# Patient Record
Sex: MALE | Race: WHITE | NOT HISPANIC OR LATINO | URBAN - METROPOLITAN AREA
[De-identification: names, ages, dates, MRNs, and addresses within clinical notes are randomized per-mention and may not be internally consistent; named-entity substitution may affect disease eponyms.]

---

## 2018-07-16 ENCOUNTER — INPATIENT (INPATIENT)
Facility: HOSPITAL | Age: 58
LOS: 0 days | Discharge: LEFT BEFORE DISCHARGE | DRG: 310 | End: 2018-07-16
Attending: INTERNAL MEDICINE | Admitting: INTERNAL MEDICINE
Payer: COMMERCIAL

## 2018-07-16 VITALS
OXYGEN SATURATION: 97 % | HEART RATE: 168 BPM | DIASTOLIC BLOOD PRESSURE: 98 MMHG | RESPIRATION RATE: 18 BRPM | SYSTOLIC BLOOD PRESSURE: 116 MMHG

## 2018-07-16 VITALS — SYSTOLIC BLOOD PRESSURE: 110 MMHG | DIASTOLIC BLOOD PRESSURE: 68 MMHG | HEART RATE: 109 BPM

## 2018-07-16 DIAGNOSIS — R73.9 HYPERGLYCEMIA, UNSPECIFIED: ICD-10-CM

## 2018-07-16 DIAGNOSIS — I48.91 UNSPECIFIED ATRIAL FIBRILLATION: ICD-10-CM

## 2018-07-16 DIAGNOSIS — K21.9 GASTRO-ESOPHAGEAL REFLUX DISEASE WITHOUT ESOPHAGITIS: ICD-10-CM

## 2018-07-16 DIAGNOSIS — Z29.9 ENCOUNTER FOR PROPHYLACTIC MEASURES, UNSPECIFIED: ICD-10-CM

## 2018-07-16 DIAGNOSIS — J44.9 CHRONIC OBSTRUCTIVE PULMONARY DISEASE, UNSPECIFIED: ICD-10-CM

## 2018-07-16 DIAGNOSIS — E03.9 HYPOTHYROIDISM, UNSPECIFIED: ICD-10-CM

## 2018-07-16 DIAGNOSIS — R07.9 CHEST PAIN, UNSPECIFIED: ICD-10-CM

## 2018-07-16 LAB
ALBUMIN SERPL ELPH-MCNC: 3.5 G/DL — SIGNIFICANT CHANGE UP (ref 3.5–5)
ALP SERPL-CCNC: 75 U/L — SIGNIFICANT CHANGE UP (ref 40–120)
ALT FLD-CCNC: 47 U/L DA — SIGNIFICANT CHANGE UP (ref 10–60)
ANION GAP SERPL CALC-SCNC: 7 MMOL/L — SIGNIFICANT CHANGE UP (ref 5–17)
APPEARANCE UR: CLEAR — SIGNIFICANT CHANGE UP
APTT BLD: 28.3 SEC — SIGNIFICANT CHANGE UP (ref 27.5–37.4)
AST SERPL-CCNC: 33 U/L — SIGNIFICANT CHANGE UP (ref 10–40)
BASOPHILS # BLD AUTO: 0.2 K/UL — SIGNIFICANT CHANGE UP (ref 0–0.2)
BASOPHILS # BLD AUTO: 0.2 K/UL — SIGNIFICANT CHANGE UP (ref 0–0.2)
BASOPHILS NFR BLD AUTO: 1.2 % — SIGNIFICANT CHANGE UP (ref 0–2)
BASOPHILS NFR BLD AUTO: 1.4 % — SIGNIFICANT CHANGE UP (ref 0–2)
BILIRUB SERPL-MCNC: 1.9 MG/DL — HIGH (ref 0.2–1.2)
BILIRUB UR-MCNC: NEGATIVE — SIGNIFICANT CHANGE UP
BUN SERPL-MCNC: 12 MG/DL — SIGNIFICANT CHANGE UP (ref 7–18)
CALCIUM SERPL-MCNC: 8.9 MG/DL — SIGNIFICANT CHANGE UP (ref 8.4–10.5)
CHLORIDE SERPL-SCNC: 109 MMOL/L — HIGH (ref 96–108)
CHOLEST SERPL-MCNC: 166 MG/DL — SIGNIFICANT CHANGE UP (ref 10–199)
CK MB BLD-MCNC: 0.4 % — SIGNIFICANT CHANGE UP (ref 0–3.5)
CK MB BLD-MCNC: 0.4 % — SIGNIFICANT CHANGE UP (ref 0–3.5)
CK MB CFR SERPL CALC: 1.1 NG/ML — SIGNIFICANT CHANGE UP (ref 0–3.6)
CK MB CFR SERPL CALC: 1.3 NG/ML — SIGNIFICANT CHANGE UP (ref 0–3.6)
CK SERPL-CCNC: 271 U/L — HIGH (ref 35–232)
CK SERPL-CCNC: 297 U/L — HIGH (ref 35–232)
CO2 SERPL-SCNC: 25 MMOL/L — SIGNIFICANT CHANGE UP (ref 22–31)
COLOR SPEC: YELLOW — SIGNIFICANT CHANGE UP
CREAT SERPL-MCNC: 1.29 MG/DL — SIGNIFICANT CHANGE UP (ref 0.5–1.3)
D DIMER BLD IA.RAPID-MCNC: 205 NG/ML DDU — SIGNIFICANT CHANGE UP
DIFF PNL FLD: ABNORMAL
EOSINOPHIL # BLD AUTO: 0.2 K/UL — SIGNIFICANT CHANGE UP (ref 0–0.5)
EOSINOPHIL # BLD AUTO: 0.3 K/UL — SIGNIFICANT CHANGE UP (ref 0–0.5)
EOSINOPHIL NFR BLD AUTO: 1.5 % — SIGNIFICANT CHANGE UP (ref 0–6)
EOSINOPHIL NFR BLD AUTO: 2.3 % — SIGNIFICANT CHANGE UP (ref 0–6)
GLUCOSE SERPL-MCNC: 84 MG/DL — SIGNIFICANT CHANGE UP (ref 70–99)
GLUCOSE UR QL: NEGATIVE — SIGNIFICANT CHANGE UP
HCT VFR BLD CALC: 40.1 % — SIGNIFICANT CHANGE UP (ref 39–50)
HCT VFR BLD CALC: 42 % — SIGNIFICANT CHANGE UP (ref 39–50)
HDLC SERPL-MCNC: 40 MG/DL — SIGNIFICANT CHANGE UP (ref 40–125)
HGB BLD-MCNC: 13.1 G/DL — SIGNIFICANT CHANGE UP (ref 13–17)
HGB BLD-MCNC: 13.7 G/DL — SIGNIFICANT CHANGE UP (ref 13–17)
INR BLD: 1.13 RATIO — SIGNIFICANT CHANGE UP (ref 0.88–1.16)
KETONES UR-MCNC: NEGATIVE — SIGNIFICANT CHANGE UP
LEUKOCYTE ESTERASE UR-ACNC: NEGATIVE — SIGNIFICANT CHANGE UP
LIDOCAIN IGE QN: 61 U/L — LOW (ref 73–393)
LIPID PNL WITH DIRECT LDL SERPL: 94 MG/DL — SIGNIFICANT CHANGE UP
LYMPHOCYTES # BLD AUTO: 18.7 % — SIGNIFICANT CHANGE UP (ref 13–44)
LYMPHOCYTES # BLD AUTO: 2.4 K/UL — SIGNIFICANT CHANGE UP (ref 1–3.3)
LYMPHOCYTES # BLD AUTO: 2.5 K/UL — SIGNIFICANT CHANGE UP (ref 1–3.3)
LYMPHOCYTES # BLD AUTO: 21.5 % — SIGNIFICANT CHANGE UP (ref 13–44)
MAGNESIUM SERPL-MCNC: 2.2 MG/DL — SIGNIFICANT CHANGE UP (ref 1.6–2.6)
MCHC RBC-ENTMCNC: 30.6 PG — SIGNIFICANT CHANGE UP (ref 27–34)
MCHC RBC-ENTMCNC: 31 PG — SIGNIFICANT CHANGE UP (ref 27–34)
MCHC RBC-ENTMCNC: 32.6 GM/DL — SIGNIFICANT CHANGE UP (ref 32–36)
MCHC RBC-ENTMCNC: 32.7 GM/DL — SIGNIFICANT CHANGE UP (ref 32–36)
MCV RBC AUTO: 94 FL — SIGNIFICANT CHANGE UP (ref 80–100)
MCV RBC AUTO: 94.9 FL — SIGNIFICANT CHANGE UP (ref 80–100)
MONOCYTES # BLD AUTO: 1.2 K/UL — HIGH (ref 0–0.9)
MONOCYTES # BLD AUTO: 1.3 K/UL — HIGH (ref 0–0.9)
MONOCYTES NFR BLD AUTO: 12 % — SIGNIFICANT CHANGE UP (ref 2–14)
MONOCYTES NFR BLD AUTO: 8.8 % — SIGNIFICANT CHANGE UP (ref 2–14)
NEUTROPHILS # BLD AUTO: 7 K/UL — SIGNIFICANT CHANGE UP (ref 1.8–7.4)
NEUTROPHILS # BLD AUTO: 9.3 K/UL — HIGH (ref 1.8–7.4)
NEUTROPHILS NFR BLD AUTO: 62.8 % — SIGNIFICANT CHANGE UP (ref 43–77)
NEUTROPHILS NFR BLD AUTO: 69.9 % — SIGNIFICANT CHANGE UP (ref 43–77)
NITRITE UR-MCNC: NEGATIVE — SIGNIFICANT CHANGE UP
NT-PROBNP SERPL-SCNC: 2160 PG/ML — HIGH (ref 0–125)
PH UR: 5 — SIGNIFICANT CHANGE UP (ref 5–8)
PLATELET # BLD AUTO: 209 K/UL — SIGNIFICANT CHANGE UP (ref 150–400)
PLATELET # BLD AUTO: 240 K/UL — SIGNIFICANT CHANGE UP (ref 150–400)
POTASSIUM SERPL-MCNC: 3.9 MMOL/L — SIGNIFICANT CHANGE UP (ref 3.5–5.3)
POTASSIUM SERPL-SCNC: 3.9 MMOL/L — SIGNIFICANT CHANGE UP (ref 3.5–5.3)
PROT SERPL-MCNC: 7.6 G/DL — SIGNIFICANT CHANGE UP (ref 6–8.3)
PROT UR-MCNC: 30 MG/DL
PROTHROM AB SERPL-ACNC: 12.4 SEC — SIGNIFICANT CHANGE UP (ref 9.8–12.7)
RAPID RVP RESULT: SIGNIFICANT CHANGE UP
RBC # BLD: 4.22 M/UL — SIGNIFICANT CHANGE UP (ref 4.2–5.8)
RBC # BLD: 4.47 M/UL — SIGNIFICANT CHANGE UP (ref 4.2–5.8)
RBC # FLD: 12.1 % — SIGNIFICANT CHANGE UP (ref 10.3–14.5)
RBC # FLD: 12.4 % — SIGNIFICANT CHANGE UP (ref 10.3–14.5)
SODIUM SERPL-SCNC: 141 MMOL/L — SIGNIFICANT CHANGE UP (ref 135–145)
SP GR SPEC: 1.02 — SIGNIFICANT CHANGE UP (ref 1.01–1.02)
TOTAL CHOLESTEROL/HDL RATIO MEASUREMENT: 4.2 RATIO — SIGNIFICANT CHANGE UP (ref 3.4–9.6)
TRIGL SERPL-MCNC: 161 MG/DL — HIGH (ref 10–149)
TROPONIN I SERPL-MCNC: <0.015 NG/ML — SIGNIFICANT CHANGE UP (ref 0–0.04)
TROPONIN I SERPL-MCNC: <0.015 NG/ML — SIGNIFICANT CHANGE UP (ref 0–0.04)
TSH SERPL-MCNC: 2.71 UU/ML — SIGNIFICANT CHANGE UP (ref 0.34–4.82)
UROBILINOGEN FLD QL: 1
WBC # BLD: 11.1 K/UL — HIGH (ref 3.8–10.5)
WBC # BLD: 13.3 K/UL — HIGH (ref 3.8–10.5)
WBC # FLD AUTO: 11.1 K/UL — HIGH (ref 3.8–10.5)
WBC # FLD AUTO: 13.3 K/UL — HIGH (ref 3.8–10.5)

## 2018-07-16 PROCEDURE — 81001 URINALYSIS AUTO W/SCOPE: CPT

## 2018-07-16 PROCEDURE — 82553 CREATINE MB FRACTION: CPT

## 2018-07-16 PROCEDURE — 96374 THER/PROPH/DIAG INJ IV PUSH: CPT

## 2018-07-16 PROCEDURE — 80061 LIPID PANEL: CPT

## 2018-07-16 PROCEDURE — 83690 ASSAY OF LIPASE: CPT

## 2018-07-16 PROCEDURE — 85610 PROTHROMBIN TIME: CPT

## 2018-07-16 PROCEDURE — 84439 ASSAY OF FREE THYROXINE: CPT

## 2018-07-16 PROCEDURE — 71046 X-RAY EXAM CHEST 2 VIEWS: CPT

## 2018-07-16 PROCEDURE — 99285 EMERGENCY DEPT VISIT HI MDM: CPT | Mod: 25

## 2018-07-16 PROCEDURE — 80053 COMPREHEN METABOLIC PANEL: CPT

## 2018-07-16 PROCEDURE — 99285 EMERGENCY DEPT VISIT HI MDM: CPT

## 2018-07-16 PROCEDURE — 93005 ELECTROCARDIOGRAM TRACING: CPT

## 2018-07-16 PROCEDURE — 82550 ASSAY OF CK (CPK): CPT

## 2018-07-16 PROCEDURE — 83036 HEMOGLOBIN GLYCOSYLATED A1C: CPT

## 2018-07-16 PROCEDURE — 71046 X-RAY EXAM CHEST 2 VIEWS: CPT | Mod: 26

## 2018-07-16 PROCEDURE — 85379 FIBRIN DEGRADATION QUANT: CPT

## 2018-07-16 PROCEDURE — 84443 ASSAY THYROID STIM HORMONE: CPT

## 2018-07-16 PROCEDURE — 84484 ASSAY OF TROPONIN QUANT: CPT

## 2018-07-16 PROCEDURE — 87633 RESP VIRUS 12-25 TARGETS: CPT

## 2018-07-16 PROCEDURE — 83880 ASSAY OF NATRIURETIC PEPTIDE: CPT

## 2018-07-16 PROCEDURE — 96376 TX/PRO/DX INJ SAME DRUG ADON: CPT

## 2018-07-16 PROCEDURE — 85730 THROMBOPLASTIN TIME PARTIAL: CPT

## 2018-07-16 PROCEDURE — 83735 ASSAY OF MAGNESIUM: CPT

## 2018-07-16 PROCEDURE — 85027 COMPLETE CBC AUTOMATED: CPT

## 2018-07-16 PROCEDURE — 87486 CHLMYD PNEUM DNA AMP PROBE: CPT

## 2018-07-16 PROCEDURE — 87798 DETECT AGENT NOS DNA AMP: CPT

## 2018-07-16 PROCEDURE — 87581 M.PNEUMON DNA AMP PROBE: CPT

## 2018-07-16 PROCEDURE — 87086 URINE CULTURE/COLONY COUNT: CPT

## 2018-07-16 PROCEDURE — 94640 AIRWAY INHALATION TREATMENT: CPT

## 2018-07-16 RX ORDER — ENOXAPARIN SODIUM 100 MG/ML
100 INJECTION SUBCUTANEOUS
Qty: 0 | Refills: 0 | Status: DISCONTINUED | OUTPATIENT
Start: 2018-07-16 | End: 2018-07-16

## 2018-07-16 RX ORDER — DILTIAZEM HCL 120 MG
15 CAPSULE, EXT RELEASE 24 HR ORAL
Qty: 125 | Refills: 0 | Status: DISCONTINUED | OUTPATIENT
Start: 2018-07-16 | End: 2018-07-16

## 2018-07-16 RX ORDER — MONTELUKAST 4 MG/1
10 TABLET, CHEWABLE ORAL AT BEDTIME
Qty: 0 | Refills: 0 | Status: DISCONTINUED | OUTPATIENT
Start: 2018-07-16 | End: 2018-07-16

## 2018-07-16 RX ORDER — SODIUM CHLORIDE 9 MG/ML
1000 INJECTION INTRAMUSCULAR; INTRAVENOUS; SUBCUTANEOUS
Qty: 0 | Refills: 0 | Status: DISCONTINUED | OUTPATIENT
Start: 2018-07-16 | End: 2018-07-16

## 2018-07-16 RX ORDER — SODIUM CHLORIDE 9 MG/ML
3 INJECTION INTRAMUSCULAR; INTRAVENOUS; SUBCUTANEOUS ONCE
Qty: 0 | Refills: 0 | Status: COMPLETED | OUTPATIENT
Start: 2018-07-16 | End: 2018-07-16

## 2018-07-16 RX ORDER — DIGOXIN 250 MCG
0.25 TABLET ORAL ONCE
Qty: 0 | Refills: 0 | Status: COMPLETED | OUTPATIENT
Start: 2018-07-16 | End: 2018-07-16

## 2018-07-16 RX ORDER — METOPROLOL TARTRATE 50 MG
25 TABLET ORAL
Qty: 0 | Refills: 0 | Status: DISCONTINUED | OUTPATIENT
Start: 2018-07-16 | End: 2018-07-16

## 2018-07-16 RX ORDER — TIOTROPIUM BROMIDE 18 UG/1
1 CAPSULE ORAL; RESPIRATORY (INHALATION) DAILY
Qty: 0 | Refills: 0 | Status: DISCONTINUED | OUTPATIENT
Start: 2018-07-16 | End: 2018-07-16

## 2018-07-16 RX ORDER — ASPIRIN/CALCIUM CARB/MAGNESIUM 324 MG
81 TABLET ORAL DAILY
Qty: 0 | Refills: 0 | Status: DISCONTINUED | OUTPATIENT
Start: 2018-07-16 | End: 2018-07-16

## 2018-07-16 RX ORDER — IPRATROPIUM/ALBUTEROL SULFATE 18-103MCG
3 AEROSOL WITH ADAPTER (GRAM) INHALATION EVERY 6 HOURS
Qty: 0 | Refills: 0 | Status: DISCONTINUED | OUTPATIENT
Start: 2018-07-16 | End: 2018-07-16

## 2018-07-16 RX ORDER — PANTOPRAZOLE SODIUM 20 MG/1
40 TABLET, DELAYED RELEASE ORAL
Qty: 0 | Refills: 0 | Status: DISCONTINUED | OUTPATIENT
Start: 2018-07-16 | End: 2018-07-16

## 2018-07-16 RX ORDER — ASPIRIN/CALCIUM CARB/MAGNESIUM 324 MG
325 TABLET ORAL ONCE
Qty: 0 | Refills: 0 | Status: COMPLETED | OUTPATIENT
Start: 2018-07-16 | End: 2018-07-16

## 2018-07-16 RX ORDER — FUROSEMIDE 40 MG
20 TABLET ORAL DAILY
Qty: 0 | Refills: 0 | Status: DISCONTINUED | OUTPATIENT
Start: 2018-07-16 | End: 2018-07-16

## 2018-07-16 RX ORDER — ATORVASTATIN CALCIUM 80 MG/1
40 TABLET, FILM COATED ORAL AT BEDTIME
Qty: 0 | Refills: 0 | Status: DISCONTINUED | OUTPATIENT
Start: 2018-07-16 | End: 2018-07-16

## 2018-07-16 RX ORDER — NICOTINE POLACRILEX 2 MG
1 GUM BUCCAL DAILY
Qty: 0 | Refills: 0 | Status: DISCONTINUED | OUTPATIENT
Start: 2018-07-16 | End: 2018-07-16

## 2018-07-16 RX ORDER — LEVOTHYROXINE SODIUM 125 MCG
75 TABLET ORAL DAILY
Qty: 0 | Refills: 0 | Status: DISCONTINUED | OUTPATIENT
Start: 2018-07-16 | End: 2018-07-16

## 2018-07-16 RX ORDER — METOPROLOL TARTRATE 50 MG
12.5 TABLET ORAL DAILY
Qty: 0 | Refills: 0 | Status: DISCONTINUED | OUTPATIENT
Start: 2018-07-16 | End: 2018-07-16

## 2018-07-16 RX ADMIN — MONTELUKAST 10 MILLIGRAM(S): 4 TABLET, CHEWABLE ORAL at 22:09

## 2018-07-16 RX ADMIN — ATORVASTATIN CALCIUM 40 MILLIGRAM(S): 80 TABLET, FILM COATED ORAL at 22:09

## 2018-07-16 RX ADMIN — SODIUM CHLORIDE 125 MILLILITER(S): 9 INJECTION INTRAMUSCULAR; INTRAVENOUS; SUBCUTANEOUS at 13:04

## 2018-07-16 RX ADMIN — ENOXAPARIN SODIUM 100 MILLIGRAM(S): 100 INJECTION SUBCUTANEOUS at 18:17

## 2018-07-16 RX ADMIN — Medication 1 PATCH: at 22:02

## 2018-07-16 RX ADMIN — Medication 15 MG/HR: at 21:04

## 2018-07-16 RX ADMIN — Medication 325 MILLIGRAM(S): at 13:04

## 2018-07-16 RX ADMIN — Medication 81 MILLIGRAM(S): at 18:18

## 2018-07-16 RX ADMIN — PANTOPRAZOLE SODIUM 40 MILLIGRAM(S): 20 TABLET, DELAYED RELEASE ORAL at 18:17

## 2018-07-16 RX ADMIN — Medication 0.25 MILLIGRAM(S): at 15:51

## 2018-07-16 RX ADMIN — Medication 5 MG/HR: at 18:20

## 2018-07-16 RX ADMIN — TIOTROPIUM BROMIDE 1 CAPSULE(S): 18 CAPSULE ORAL; RESPIRATORY (INHALATION) at 18:23

## 2018-07-16 RX ADMIN — SODIUM CHLORIDE 3 MILLILITER(S): 9 INJECTION INTRAMUSCULAR; INTRAVENOUS; SUBCUTANEOUS at 13:04

## 2018-07-16 RX ADMIN — Medication 3 MILLILITER(S): at 22:10

## 2018-07-16 NOTE — ED PROVIDER NOTE - MEDICAL DECISION MAKING DETAILS
chest pain, sob, new onset a-fib in pt with HTN, high chol, active smoker-will do labs, CXR, rate control and reasses

## 2018-07-16 NOTE — H&P ADULT - ASSESSMENT
57 yo male with PMH of GERD , Hashimoto's thyroid ( on Levothyroxine ) , has not been to doctor in years , comes in with complaint of chest pressure and sob. Chest pressure is going on for years now , but today he felt like he could not breathe and decided to come to ER. Shortness of breath is on minimal exertion , going on for few weeks , not at rest . ROS is positive for palpitations and cough , cold, congestion for a week. Patient had a really bad respiratory tract infection about a month ago. Denies any abdominal pain , fever , nausea , vomiting , headache , leg swelling , orthopnea , pnd.     In Ed , BP : 116/98 mm hg , Hr in 160s , Temp : 98.6 F  cbc shows white count of 13.3 with bands , t1 negative , blood glucose 309   Pro bnp 2000 , bmp wnl   Chest xray wnl   EKG shows Afib with RVR     Will admit to telemetry for new onset Atrial fibrillation with RVR.

## 2018-07-16 NOTE — ED ADULT NURSE REASSESSMENT NOTE - NS ED NURSE REASSESS COMMENT FT1
Pt denies respiratory distress, occasionally complains of chest discomfort. Tachycardic, A. Fib on cardiac monitor.

## 2018-07-16 NOTE — H&P ADULT - ATTENDING COMMENTS
[facility] NEPHROLOGY  Ruiz Mathis M.D.  Kamari Covarrubias D.O.  Graciela Alva M.D.  Kaylynn Miranda, MSN, ANP-C    Telephone: [phone]  Facsimile: [phone]    [id number] [place] [address]

## 2018-07-16 NOTE — ED ADULT NURSE REASSESSMENT NOTE - NS ED NURSE REASSESS COMMENT FT1
Pt  bpm. Dr Kendra Aguila informed and rate of cardizem increased to 10ml/hr per verbal order of Dr Kendra Aguila. Pt denies any complains. Pt not in distress.

## 2018-07-16 NOTE — H&P ADULT - PROBLEM SELECTOR PLAN 7
IMPROVE VTE Individual Risk Assessment          RISK                                                          Points  [  ] Previous VTE                                                3  [  ] Thrombophilia                                             2  [  ] Lower limb paralysis                                   2        (unable to hold up >15 seconds)    [  ] Current Cancer                                             2         (within 6 months)  [x  ] Immobilization > 24 hrs                              1  [  ] ICU/CCU stay > 24 hours                             1  [  ] Age > 60                                                         1    IMPROVE VTE Score: 1  No indication of dvt ppx

## 2018-07-16 NOTE — H&P ADULT - PROBLEM SELECTOR PLAN 1
Patient comes in with Afib with RVR , with heart rate in 160s  New onset Afib   s/p Cardizem , 10 *2 , and heart rate was still uncontrolled   Will start on Cardizem drip  CHADSvasc 1 , however will need anticoagulation as he may need cardioversion   Will start on full dose Levonox   Inciting factor is unclear , could be sinusitis / COPD exacerbation   Will admit to telemetry   Trend troponin  Start on low dose b-blocker Patient comes in with Afib with RVR , with heart rate in 160s  New onset Afib   s/p Cardizem , 10 *2 , and heart rate was still uncontrolled   Will start on Cardizem drip  CHADSvasc 0 , however will need anticoagulation as he may need cardioversion   Will start on full dose Levonox   Inciting factor is unclear , could be sinusitis / COPD exacerbation   Will admit to telemetry   Trend troponin  Start on low dose b-blocker as BP on lower side Patient comes in with Afib with RVR , with heart rate in 160s  New onset Afib   s/p Cardizem , 10 *2 , and heart rate was still uncontrolled   Will start on Cardizem drip  CHADSvasc 0 , however will need anticoagulation as he may need cardioversion   Will start on full dose Levonox   Inciting factor is unclear , could be sinusitis / COPD exacerbation   Will admit to telemetry   Trend troponin  Start on low dose b-blocker as BP on lower side    Cardiology consult: Dr. Damico

## 2018-07-16 NOTE — H&P ADULT - PROBLEM SELECTOR PLAN 5
Patient complaints of cough with sputum production and continuation of respiratory infection from last month   will get RVP  Start on bronchodilators and cough medications   Spiriva , montelukast  Nicotine patch accepted , active smoker

## 2018-07-16 NOTE — H&P ADULT - PROBLEM SELECTOR PLAN 6
IMPROVE VTE Individual Risk Assessment          RISK                                                          Points  [  ] Previous VTE                                                3  [  ] Thrombophilia                                             2  [  ] Lower limb paralysis                                   2        (unable to hold up >15 seconds)    [  ] Current Cancer                                             2         (within 6 months)  [x  ] Immobilization > 24 hrs                              1  [  ] ICU/CCU stay > 24 hours                             1  [  ] Age > 60                                                         1    IMPROVE VTE Score: 1  No indication of dvt ppx Denies any h/o DM , blood sugars elevated   Also following up outpatient currently for erectile dysfunction   Will get a1c , start on hss for now

## 2018-07-16 NOTE — ED ADULT NURSE REASSESSMENT NOTE - NS ED NURSE REASSESS COMMENT FT1
Pt got aggressive, pulled his IV out and left the unit. Manager and supervisor made aware. Dr Dago Méndez made aware. Pt got aggressive, pulled his IV out and left the unit. Manager Karina and supervisor Wilian made aware. The supervisor tried counselling regarding the bed status and the need of high acuity bed for the patient but patient impatient regardig the bed to the unit and pulled his IV off and left.  Dr Dago Méndez made aware. Attending doctor Jeff aware. Pt did not sign AMA form. Pt Axox3 and ambulatory with steady gait. Pt not in distress.

## 2018-07-16 NOTE — H&P ADULT - HISTORY OF PRESENT ILLNESS
59 yo male with PMH of GERD , Hashimoto's thyroid ( on Levothyroxine ) , has not been to doctor in years , comes in with complaint of chest pressure and sob. Chest pressure is going on for years now , but today he felt like he could not breathe and decided to come to ER. Shortness of breath is on minimal exertion , going on for few weeks , not at rest . ROS is positive for palpitations and cough , cold, congestion for a week. Patient had a really bad respiratory tract infection about a month ago. Denies any abdominal pain , fever , nausea , vomiting , headache , leg swelling , orthopnea , pnd.     In Ed , BP : 116/98 mm hg , Hr in 160s , Temp : 98.6 F  cbc shows white count of 13.3 with bands , t1 negative , blood glucose 309   Pro bnp 2000 , bmp wnl   Chest xray wnl

## 2018-07-16 NOTE — ED PROVIDER NOTE - OBJECTIVE STATEMENT
H/o untreated HTN, high chol, active smoker here for 1 week of intermittent left sided chest pain, sharp. Reports pressure like pain with SOB today, BECKER. Went to see urologist today and mentioned symptoms, directed to ED. reports did not see a primary doctor in years, although he states he saw endocrinologist for "thyroid problem" in the past. No f/c/cough or leg swelling.

## 2018-07-16 NOTE — H&P ADULT - NSHPPHYSICALEXAM_GEN_ALL_CORE
PHYSICAL EXAM:  GENERAL: NAD  HEENT: Normocephalic;  conjunctivae and sclerae clear; moist mucous membranes;   NECK : supple  CHEST/LUNG: Clear to auscultation bilaterally with good air entry   HEART: S1 S2  irregular, tachycardia+ ; no murmurs, gallops or rubs  ABDOMEN: Soft, Nontender, Nondistended; Bowel sounds present  EXTREMITIES: no cyanosis; no edema; no calf tenderness  SKIN: warm and dry; no rash  NERVOUS SYSTEM:  Awake and alert; Oriented  to place, person and time ; no new deficits PHYSICAL EXAM:  GENERAL: NAD  HEENT: Normocephalic;  conjunctivae and sclerae clear; moist mucous membranes;   NECK : supple  CHEST/LUNG: Clear to auscultation bilaterally with good air entry   HEART: S1 S2  irregularly irreglar, tachycardia+ ; no murmurs, gallops or rubs  ABDOMEN: Soft, Nontender, Nondistended; Bowel sounds present  EXTREMITIES: no cyanosis; no edema; no calf tenderness  SKIN: warm and dry; no rash  NERVOUS SYSTEM:  Awake and alert; Oriented  to place, person and time ; no new deficits

## 2018-07-16 NOTE — H&P ADULT - PROBLEM SELECTOR PLAN 2
Patient comes in with chest pressure and sob   RF :Age , smoking   T1 negative   EKG shows Afib with rvr   Will trend T2 , T3   Will start on aspirin , statin , b-blocker   Will monitor on tele , consult Cardio   Echocardiogram Patient comes in with chest pressure and sob   RF :Age , smoking   T1 negative   EKG shows Afib with rvr   Will trend T2 , T3   Will start on aspirin , statin , b-blocker   Will monitor on tele , consult Cardio   Echocardiogram.  Unclear if pt can tolerate stress test. May require cardiac catheterization.    Cardiology consult: Dr. Damico

## 2018-07-16 NOTE — H&P ADULT - NSHPLABSRESULTS_GEN_ALL_CORE
13.7   13.3  )-----------( 240      ( 16 Jul 2018 13:15 )             42.0     07-16    141  |  109<H>  |  12  ----------------------------<  84  3.9   |  25  |  1.29    Ca    8.9      16 Jul 2018 13:15  Mg     2.2     07-16    TPro  7.6  /  Alb  3.5  /  TBili  1.9<H>  /  DBili  x   /  AST  33  /  ALT  47  /  AlkPhos  75  07-16    < from: Xray Chest 2 Views PA/Lat (07.16.18 @ 13:52) >      Findings:     No acute infiltrates, congestion or pleural effusions  .      The pulmonary vasculature and aorta are normal for age. Heart size is   unremarkable.     The thorax is normal for age.    Impression: No acute pulmonary disease.        < end of copied text >

## 2018-07-17 ENCOUNTER — INPATIENT (INPATIENT)
Facility: HOSPITAL | Age: 58
LOS: 1 days | Discharge: HOME | End: 2018-07-19
Attending: INTERNAL MEDICINE | Admitting: INTERNAL MEDICINE
Payer: COMMERCIAL

## 2018-07-17 VITALS
HEART RATE: 147 BPM | SYSTOLIC BLOOD PRESSURE: 127 MMHG | OXYGEN SATURATION: 98 % | DIASTOLIC BLOOD PRESSURE: 94 MMHG | TEMPERATURE: 98 F | RESPIRATION RATE: 22 BRPM

## 2018-07-17 DIAGNOSIS — I48.91 UNSPECIFIED ATRIAL FIBRILLATION: ICD-10-CM

## 2018-07-17 DIAGNOSIS — J44.9 CHRONIC OBSTRUCTIVE PULMONARY DISEASE, UNSPECIFIED: ICD-10-CM

## 2018-07-17 DIAGNOSIS — E03.9 HYPOTHYROIDISM, UNSPECIFIED: ICD-10-CM

## 2018-07-17 DIAGNOSIS — R19.7 DIARRHEA, UNSPECIFIED: ICD-10-CM

## 2018-07-17 DIAGNOSIS — I24.9 ACUTE ISCHEMIC HEART DISEASE, UNSPECIFIED: ICD-10-CM

## 2018-07-17 DIAGNOSIS — K21.9 GASTRO-ESOPHAGEAL REFLUX DISEASE WITHOUT ESOPHAGITIS: ICD-10-CM

## 2018-07-17 DIAGNOSIS — Z90.49 ACQUIRED ABSENCE OF OTHER SPECIFIED PARTS OF DIGESTIVE TRACT: Chronic | ICD-10-CM

## 2018-07-17 LAB
ALBUMIN SERPL ELPH-MCNC: 4.1 G/DL — SIGNIFICANT CHANGE UP (ref 3.3–5)
ALP SERPL-CCNC: 81 U/L — SIGNIFICANT CHANGE UP (ref 40–120)
ALT FLD-CCNC: 40 U/L — SIGNIFICANT CHANGE UP (ref 4–41)
APAP SERPL-MCNC: < 15 UG/ML — LOW (ref 15–25)
APTT BLD: 107.5 SEC — HIGH (ref 27.5–37.4)
APTT BLD: 27.5 SEC — SIGNIFICANT CHANGE UP (ref 27.5–37.4)
AST SERPL-CCNC: 31 U/L — SIGNIFICANT CHANGE UP (ref 4–40)
BASOPHILS # BLD AUTO: 0.06 K/UL — SIGNIFICANT CHANGE UP (ref 0–0.2)
BASOPHILS NFR BLD AUTO: 0.7 % — SIGNIFICANT CHANGE UP (ref 0–2)
BILIRUB SERPL-MCNC: 1.8 MG/DL — HIGH (ref 0.2–1.2)
BUN SERPL-MCNC: 13 MG/DL — SIGNIFICANT CHANGE UP (ref 7–23)
CALCIUM SERPL-MCNC: 8.7 MG/DL — SIGNIFICANT CHANGE UP (ref 8.4–10.5)
CHLORIDE SERPL-SCNC: 106 MMOL/L — SIGNIFICANT CHANGE UP (ref 98–107)
CO2 SERPL-SCNC: 21 MMOL/L — LOW (ref 22–31)
CREAT SERPL-MCNC: 1.17 MG/DL — SIGNIFICANT CHANGE UP (ref 0.5–1.3)
CULTURE RESULTS: SIGNIFICANT CHANGE UP
EOSINOPHIL # BLD AUTO: 0 K/UL — SIGNIFICANT CHANGE UP (ref 0–0.5)
EOSINOPHIL NFR BLD AUTO: 0 % — SIGNIFICANT CHANGE UP (ref 0–6)
ETHANOL BLD-MCNC: < 10 MG/DL — SIGNIFICANT CHANGE UP
GLUCOSE SERPL-MCNC: 93 MG/DL — SIGNIFICANT CHANGE UP (ref 70–99)
HBA1C BLD-MCNC: 5.6 % — SIGNIFICANT CHANGE UP (ref 4–5.6)
HCT VFR BLD CALC: 37.4 % — LOW (ref 39–50)
HCT VFR BLD CALC: 39.5 % — SIGNIFICANT CHANGE UP (ref 39–50)
HGB BLD-MCNC: 12.3 G/DL — LOW (ref 13–17)
HGB BLD-MCNC: 13.1 G/DL — SIGNIFICANT CHANGE UP (ref 13–17)
IMM GRANULOCYTES # BLD AUTO: 0.03 # — SIGNIFICANT CHANGE UP
IMM GRANULOCYTES NFR BLD AUTO: 0.3 % — SIGNIFICANT CHANGE UP (ref 0–1.5)
INR BLD: 1.13 — SIGNIFICANT CHANGE UP (ref 0.88–1.17)
LYMPHOCYTES # BLD AUTO: 2.14 K/UL — SIGNIFICANT CHANGE UP (ref 1–3.3)
LYMPHOCYTES # BLD AUTO: 23.8 % — SIGNIFICANT CHANGE UP (ref 13–44)
MCHC RBC-ENTMCNC: 30.6 PG — SIGNIFICANT CHANGE UP (ref 27–34)
MCHC RBC-ENTMCNC: 30.6 PG — SIGNIFICANT CHANGE UP (ref 27–34)
MCHC RBC-ENTMCNC: 32.9 % — SIGNIFICANT CHANGE UP (ref 32–36)
MCHC RBC-ENTMCNC: 33.2 % — SIGNIFICANT CHANGE UP (ref 32–36)
MCV RBC AUTO: 92.3 FL — SIGNIFICANT CHANGE UP (ref 80–100)
MCV RBC AUTO: 93 FL — SIGNIFICANT CHANGE UP (ref 80–100)
MONOCYTES # BLD AUTO: 0.98 K/UL — HIGH (ref 0–0.9)
MONOCYTES NFR BLD AUTO: 10.9 % — SIGNIFICANT CHANGE UP (ref 2–14)
NEUTROPHILS # BLD AUTO: 5.79 K/UL — SIGNIFICANT CHANGE UP (ref 1.8–7.4)
NEUTROPHILS NFR BLD AUTO: 64.3 % — SIGNIFICANT CHANGE UP (ref 43–77)
NRBC # FLD: 0 — SIGNIFICANT CHANGE UP
NRBC # FLD: 0 — SIGNIFICANT CHANGE UP
PLATELET # BLD AUTO: 221 K/UL — SIGNIFICANT CHANGE UP (ref 150–400)
PLATELET # BLD AUTO: 223 K/UL — SIGNIFICANT CHANGE UP (ref 150–400)
PMV BLD: 11.6 FL — SIGNIFICANT CHANGE UP (ref 7–13)
PMV BLD: 11.7 FL — SIGNIFICANT CHANGE UP (ref 7–13)
POTASSIUM SERPL-MCNC: 4.3 MMOL/L — SIGNIFICANT CHANGE UP (ref 3.5–5.3)
POTASSIUM SERPL-SCNC: 4.3 MMOL/L — SIGNIFICANT CHANGE UP (ref 3.5–5.3)
PROT SERPL-MCNC: 7.5 G/DL — SIGNIFICANT CHANGE UP (ref 6–8.3)
PROTHROM AB SERPL-ACNC: 13 SEC — SIGNIFICANT CHANGE UP (ref 9.8–13.1)
RBC # BLD: 4.02 M/UL — LOW (ref 4.2–5.8)
RBC # BLD: 4.28 M/UL — SIGNIFICANT CHANGE UP (ref 4.2–5.8)
RBC # FLD: 12.9 % — SIGNIFICANT CHANGE UP (ref 10.3–14.5)
RBC # FLD: 12.9 % — SIGNIFICANT CHANGE UP (ref 10.3–14.5)
SALICYLATES SERPL-MCNC: < 5 MG/DL — LOW (ref 15–30)
SODIUM SERPL-SCNC: 141 MMOL/L — SIGNIFICANT CHANGE UP (ref 135–145)
SPECIMEN SOURCE: SIGNIFICANT CHANGE UP
T3 SERPL-MCNC: 81.5 NG/DL — SIGNIFICANT CHANGE UP (ref 80–200)
T4 AB SER-ACNC: 6.98 UG/DL — SIGNIFICANT CHANGE UP (ref 5.1–13)
TROPONIN T, HIGH SENSITIVITY: 14 NG/L — SIGNIFICANT CHANGE UP (ref ?–14)
TSH SERPL-MCNC: 3.47 UIU/ML — SIGNIFICANT CHANGE UP (ref 0.27–4.2)
WBC # BLD: 10.18 K/UL — SIGNIFICANT CHANGE UP (ref 3.8–10.5)
WBC # BLD: 9 K/UL — SIGNIFICANT CHANGE UP (ref 3.8–10.5)
WBC # FLD AUTO: 10.18 K/UL — SIGNIFICANT CHANGE UP (ref 3.8–10.5)
WBC # FLD AUTO: 9 K/UL — SIGNIFICANT CHANGE UP (ref 3.8–10.5)

## 2018-07-17 PROCEDURE — 71045 X-RAY EXAM CHEST 1 VIEW: CPT | Mod: 26

## 2018-07-17 RX ORDER — METOPROLOL TARTRATE 50 MG
25 TABLET ORAL EVERY 6 HOURS
Qty: 0 | Refills: 0 | Status: DISCONTINUED | OUTPATIENT
Start: 2018-07-17 | End: 2018-07-18

## 2018-07-17 RX ORDER — LEVOTHYROXINE SODIUM 125 MCG
1 TABLET ORAL
Qty: 0 | Refills: 0 | COMMUNITY

## 2018-07-17 RX ORDER — SODIUM CHLORIDE 9 MG/ML
1000 INJECTION INTRAMUSCULAR; INTRAVENOUS; SUBCUTANEOUS ONCE
Qty: 0 | Refills: 0 | Status: COMPLETED | OUTPATIENT
Start: 2018-07-17 | End: 2018-07-17

## 2018-07-17 RX ORDER — HEPARIN SODIUM 5000 [USP'U]/ML
INJECTION INTRAVENOUS; SUBCUTANEOUS
Qty: 25000 | Refills: 0 | Status: DISCONTINUED | OUTPATIENT
Start: 2018-07-17 | End: 2018-07-17

## 2018-07-17 RX ORDER — PANTOPRAZOLE SODIUM 20 MG/1
40 TABLET, DELAYED RELEASE ORAL
Qty: 0 | Refills: 0 | Status: DISCONTINUED | OUTPATIENT
Start: 2018-07-17 | End: 2018-07-19

## 2018-07-17 RX ORDER — LEVOTHYROXINE SODIUM 125 MCG
300 TABLET ORAL
Qty: 0 | Refills: 0 | Status: DISCONTINUED | OUTPATIENT
Start: 2018-07-17 | End: 2018-07-19

## 2018-07-17 RX ORDER — LEVOTHYROXINE SODIUM 125 MCG
150 TABLET ORAL
Qty: 0 | Refills: 0 | Status: DISCONTINUED | OUTPATIENT
Start: 2018-07-17 | End: 2018-07-19

## 2018-07-17 RX ORDER — HEPARIN SODIUM 5000 [USP'U]/ML
1400 INJECTION INTRAVENOUS; SUBCUTANEOUS
Qty: 25000 | Refills: 0 | Status: DISCONTINUED | OUTPATIENT
Start: 2018-07-17 | End: 2018-07-18

## 2018-07-17 RX ORDER — ASPIRIN/CALCIUM CARB/MAGNESIUM 324 MG
81 TABLET ORAL DAILY
Qty: 0 | Refills: 0 | Status: DISCONTINUED | OUTPATIENT
Start: 2018-07-17 | End: 2018-07-19

## 2018-07-17 RX ORDER — ALBUTEROL 90 UG/1
2 AEROSOL, METERED ORAL EVERY 6 HOURS
Qty: 0 | Refills: 0 | Status: DISCONTINUED | OUTPATIENT
Start: 2018-07-17 | End: 2018-07-19

## 2018-07-17 RX ORDER — METOPROLOL TARTRATE 50 MG
2.5 TABLET ORAL ONCE
Qty: 0 | Refills: 0 | Status: COMPLETED | OUTPATIENT
Start: 2018-07-17 | End: 2018-07-17

## 2018-07-17 RX ADMIN — Medication 25 MILLIGRAM(S): at 16:52

## 2018-07-17 RX ADMIN — Medication 2.5 MILLIGRAM(S): at 16:38

## 2018-07-17 RX ADMIN — HEPARIN SODIUM 1600 UNIT(S)/HR: 5000 INJECTION INTRAVENOUS; SUBCUTANEOUS at 15:19

## 2018-07-17 RX ADMIN — HEPARIN SODIUM 1400 UNIT(S)/HR: 5000 INJECTION INTRAVENOUS; SUBCUTANEOUS at 22:38

## 2018-07-17 RX ADMIN — Medication 150 MICROGRAM(S): at 22:25

## 2018-07-17 RX ADMIN — SODIUM CHLORIDE 1000 MILLILITER(S): 9 INJECTION INTRAMUSCULAR; INTRAVENOUS; SUBCUTANEOUS at 13:09

## 2018-07-17 NOTE — H&P ADULT - PROBLEM SELECTOR PLAN 2
Obtain echocardiogram. Consider nuclear stress test when heart rate controlled and chest pain absent. If both do not resolve consider cardiac cath. Eventual cardiac cath. start ASA

## 2018-07-17 NOTE — H&P ADULT - GASTROINTESTINAL DETAILS
soft/nontender/no rebound tenderness/no rigidity/no organomegaly/no distention/no bruit/no guarding/normal/no masses palpable/bowel sounds normal

## 2018-07-17 NOTE — ED PROVIDER NOTE - MEDICAL DECISION MAKING DETAILS
58M with new onset Afib. Concern for electrolyte abnormality 58M with new onset Afib. Concern for electrolyte abnormality, ischemia, hyperthyroidism. No signs of infection. Pt does not appear dehydrated. Plan: ekg, cxr, labs, cardizem. Will control rate, start heparin, admit tele. 58M with new onset Afib. Concern for electrolyte abnormality, ischemia, hyperthyroidism. No signs of infection. Pt does not appear dehydrated. Plan: ekg, cxr, labs, cardizem. Will rate control, start heparin, admit tele.

## 2018-07-17 NOTE — ED PROVIDER NOTE - OBJECTIVE STATEMENT
[age]M h/o Hashimoto's p/w dyspnea on exertion and palpitations x 1 week, a/w chest pressure. Pt seen at [facility] yesterday, found to be in rapid Afib and admitted. Pt left [facility] AMA this morning and came to [facility]. Noted to be tachycardic to 180 in triage. +nasal congestion. No fever, cough, N/V/D. Takes levothyroxine 150mcg daily; did not take any extra doses.

## 2018-07-17 NOTE — H&P ADULT - NEUROLOGICAL DETAILS
alert and oriented x 3/sensation intact/responds to verbal commands/normal strength/cranial nerves intact

## 2018-07-17 NOTE — H&P ADULT - ASSESSMENT
57 y/o male with a PmHx of Hashimoto's thyroid and GERD, presented to [facility] ED with intermittent chest heaviness and SOB was found to have atrial fibrillation with RVR in the ED. 57 y/o male with a PmHx of Hashimoto's thyroid, COPD, ASHLI and GERD, presented to [facility] ED with intermittent chest heaviness and SOB was found to have atrial fibrillation with RVR in the ED.  EKG- Afib @ 119

## 2018-07-17 NOTE — H&P ADULT - GIT GEN HX ROS MEA POS PC
change in bowel habits/Diarrhea present for several months. Pt admits to irregular shaped stool and desiring a GI work up./diarrhea See HPI, denies ever having a colonoscopy./change in bowel habits/diarrhea

## 2018-07-17 NOTE — H&P ADULT - NEGATIVE GASTROINTESTINAL SYMPTOMS
no abdominal pain/no hematochezia/no hiccoughs/no constipation/no jaundice/no nausea/no vomiting/no flatulence/no steatorrhea/no melena

## 2018-07-17 NOTE — CONSULT NOTE ADULT - SUBJECTIVE AND OBJECTIVE BOX
HISTORY OF PRESENT ILLNESS: 57 yo male with PMH of GERD , Hashimoto's thyroid ( on Levothyroxine ) , has not been to doctor in years , comes in with complaint of chest pressure and sob. Chest pressure is going on for years now , but today he felt like he could not breathe and decided to come to ER. Shortness of breath is on minimal exertion , going on for few weeks , not at rest . ROS is positive for palpitations and cough , cold, congestion for a week. Patient had a really bad respiratory tract infection about a month ago. Denies any abdominal pain , fever , nausea , vomiting , headache , leg swelling , orthopnea , pnd.     PAST MEDICAL & SURGICAL HISTORY:  Hypothyroid  GERD (gastroesophageal reflux disease)  No significant past surgical history      [ ]x Diabetes   [x ] Hypertension  [x ] Hyperlipidemia  [x ] CAD  [ ] PCI  [ ] CABG    PREVIOUS DIAGNOSTIC TESTING:    [ ] Echocardiogram:   [ ]  Catheterization:  [ ] Stress Test:  	    MEDICATIONS:  aspirin  chewable 81 milliGRAM(s) Oral daily  diltiazem Infusion 15 mG/Hr IV Continuous <Continuous>  enoxaparin Injectable 100 milliGRAM(s) SubCutaneous two times a day  metoprolol tartrate 25 milliGRAM(s) Oral two times a day    levoFLOXacin IVPB      levoFLOXacin IVPB 500 milliGRAM(s) IV Intermittent every 24 hours    ALBUTerol/ipratropium for Nebulization 3 milliLiter(s) Nebulizer every 6 hours  guaiFENesin    Syrup 100 milliGRAM(s) Oral every 6 hours PRN  montelukast 10 milliGRAM(s) Oral at bedtime  tiotropium 18 MICROgram(s) Capsule 1 Capsule(s) Inhalation daily      pantoprazole    Tablet 40 milliGRAM(s) Oral before breakfast    atorvastatin 40 milliGRAM(s) Oral at bedtime  levothyroxine 75 MICROGram(s) Oral daily        Allergies    No Known Allergies    Intolerances        FAMILY HISTORY:  No pertinent family history in first degree relatives      SOCIAL HISTORY:    [x ] Non-smoker  [ ] Smoker  [ ] Alcohol      REVIEW OF SYSTEMS:  [x ]chest pain  [ x ]shortness of breath  [ x ]palpitations  [  ]syncope  [ ]near syncope [  ]diplopia  [  ]altered mental status   [  ]fevers  [ ]chills [ ]nausea  [ ]vomitting  [ ]abdominal pain  [ ]melena  [ ]BRBPR  [  ]epistaxis  [  ]rash  [  ]lower extremity edema      CONSTITUTIONAL: No fever, weight loss, or fatigue  EYES: No eye pain, visual disturbances, or discharge  ENMT:  No difficulty hearing, tinnitus, vertigo; No sinus or throat pain  NECK: No pain or stiffness  RESPIRATORY: No cough, wheezing, chills or hemoptysis; No Shortness of Breath  CARDIOVASCULAR: No chest pain, palpitations, passing out, dizziness, or leg swelling  GASTROINTESTINAL: No abdominal or epigastric pain. No nausea, vomiting, or hematemesis; No diarrhea or constipation. No melena or hematochezia.  GENITOURINARY: No dysuria, frequency, hematuria, or incontinence  NEUROLOGICAL: No headaches, memory loss, loss of strength, numbness, or tremors  SKIN: No itching, burning, rashes, or lesions   LYMPH Nodes: No enlarged glands  ENDOCRINE: No heat or cold intolerance; No hair loss  MUSCULOSKELETAL: No joint pain or swelling; No muscle, back, or extremity pain  PSYCHIATRIC: No depression, anxiety, mood swings, or difficulty sleeping  HEME/LYMPH: No easy bruising, or bleeding gums  ALLERY AND IMMUNOLOGIC: No hives or eczema	    [ ] All others negative	  [ ] Unable to obtain    PHYSICAL EXAM:  T(C): 37 (07-16-18 @ 15:47), Max: 37 (07-16-18 @ 15:47)  HR: 109 (07-16-18 @ 20:45) (109 - 168)  BP: 110/68 (07-16-18 @ 20:45) (105/81 - 121/79)  RR: 14 (07-16-18 @ 15:54) (14 - 20)  SpO2: 96% (07-16-18 @ 15:54) (95% - 97%)  Wt(kg): --  I&O's Summary    alert / oriented   no JVD, supple  reg s1s2  soft + bs , nontender  no edema     TELEMETRY: 	  Afib  ECG:  	Afib 109 , no acute st / t wave changes   RADIOLOGY: < from: Xray Chest 2 Views PA/Lat (07.16.18 @ 13:52) >  Impression: No acute pulmonary disease.        < end of copied text >    OTHER: 	  	  LABS:	 	    CARDIAC MARKERS:  Troponin I, Serum: <0.015 ng/mL (07-16 @ 19:57)  Troponin I, Serum: <0.015 ng/mL (07-16 @ 13:15)                                  13.1   11.1  )-----------( 209      ( 16 Jul 2018 19:57 )             40.1     07-16    141  |  109<H>  |  12  ----------------------------<  84  3.9   |  25  |  1.29    Ca    8.9      16 Jul 2018 13:15  Mg     2.2     07-16    TPro  7.6  /  Alb  3.5  /  TBili  1.9<H>  /  DBili  x   /  AST  33  /  ALT  47  /  AlkPhos  75  07-16    proBNP: Serum Pro-Brain Natriuretic Peptide: 2160 pg/mL (07-16 @ 13:15)    Lipid Profile:   HgA1c:   TSH: Thyroid Stimulating Hormone, Serum: 2.71 uU/mL (07-16 @ 13:15)      ASSESSMENT/PLAN:  58 yr male hx of gerd , hashimoto ,Afib  now chest pain and sob    ASA, statin   A/C with lovenox q 12 hr , will start PO post echo to determine valve issues.   ABX per medicine   rate control with ccb, start CCB 60 q 6   cont BB    GI / DVT prophylaxis,  keep K>4, mag >2.0   cardiac markers neg x 2  D/W Dr Damico

## 2018-07-17 NOTE — H&P ADULT - NEGATIVE MUSCULOSKELETAL SYMPTOMS
no myalgia/no arm pain L/no arm pain R/no leg pain R/no back pain/no muscle weakness/no joint swelling/no neck pain/no leg pain L/no arthritis/no muscle cramps/no stiffness

## 2018-07-17 NOTE — H&P ADULT - RS GEN PE MLT RESP DETAILS PC
good air movement/no rales/no rhonchi/airway patent/no chest wall tenderness/no intercostal retractions/clear to auscultation bilaterally/normal/breath sounds equal/no wheezes/respirations non-labored

## 2018-07-17 NOTE — H&P ADULT - PROBLEM SELECTOR PLAN 1
Admit to telemetry. Continuous ECG monitoring. Control ventricular rate with metoprolol. Start heparin drip. Start aspirin. TTE for atrial thrombus. Admit to telemetry. Control ventricular rate with metoprolol. Started on heparin drip. Check TTE. F/U Cardiology note

## 2018-07-17 NOTE — CHART NOTE - NSCHARTNOTEFT_GEN_A_CORE
I was given a sign out by the medical team to follow patient's heart rate. When I called ED to confirm his vitals signs. I was informed that patient left against medical advice. He did not wait for his discharge paper work. IV line was removed before leaving.  PGY-3 on call was informed. I was given a sign out by the medical team to follow patient's heart rate. When I called ED to confirm his vitals signs, I was informed that he left against medical advice. He did not wait for his discharge paper work. IV line was removed before leaving.    PGY-3 on call was informed.

## 2018-07-17 NOTE — H&P ADULT - CARDIOVASCULAR DETAILS
irregular rate and rhythm/positive S1/positive S2 irregular rate and rhythm/tachycardia/positive S1/positive S2

## 2018-07-17 NOTE — H&P ADULT - NEGATIVE PSYCHIATRIC SYMPTOMS
no suicidal ideation/no depression/no insomnia/no paranoia/no visual hallucinations/no anxiety/no memory loss/no mood swings/no agitation/no auditory hallucinations/no hyperactivity

## 2018-07-17 NOTE — H&P ADULT - NEGATIVE GENERAL SYMPTOMS
no chills/no sweating/no anorexia/no weight loss/no fever/no malaise/no weight gain/no polyphagia/no polyuria/no polydipsia

## 2018-07-17 NOTE — H&P ADULT - PROBLEM SELECTOR PLAN 6
Check FOBT due to irregular stool shape and diarrhea. Check FOBT due to irregular stool shape and diarrhea. Consider GI eval

## 2018-07-17 NOTE — H&P ADULT - HISTORY OF PRESENT ILLNESS
57 y/o male with a PmHx of Hashimoto's thyroid and GERD, presented to [facility] ED with intermittent chest heaviness and SOB. The pt had been seen at [facility] for the same complaint and he left last night because he was unsatisfied with the care he was receiving. The pt has been having chest heaviness with palpitations, diaphoresis and SOB, intermittently with exertion for the past 2 weeks. The chest heaviness comes on suddenly when exerting himself, lasts for less than 5 minutes, is not alleviated by rest, does not radiate, is not affected by position, is not reproducible, is non-pleuritic, and non-tender to palpation. During the exam the pt had an episode of chest pain and SOB. Pt's heart rate has been irregular and tachycardic up to 180's while in the ED.     Pt denies , abdominal pain, nausea, fever, vomiting, headache, leg swelling, orthopnea and PND. 59 y/o male with a PmHx of Hashimoto's thyroid, COPD, ASHLI and GERD, presented to [facility] ED with intermittent chest heaviness and SOB. Pt had been seen at [facility] for the same complaint and he left last night because he was unsatisfied with the care he was receiving. The pt has been having chest heaviness with palpitations, diaphoresis and SOB, intermittently with exertion for the past 2 weeks. The chest heaviness comes on suddenly when exerting himself, lasts for less than 5 minutes, is not alleviated by rest, does not radiate, is not affected by position, is not reproducible, is non-pleuritic, and non-tender to palpation. During the exam the pt had an episode of chest pain and SOB. Pt's heart rate has been irregular and tachycardic up to 180's while in the ED. Productive cough about a month ago with a cold with congestion. Also for the last few months he has noticed irregular shaped stools alternating with diarrhea, denies melena or hematochezia.  Pt denies feeling abdominal pain, nausea, fever, vomiting, headache, leg swelling, orthopnea and PND.

## 2018-07-17 NOTE — ED PROVIDER NOTE - ATTENDING CONTRIBUTION TO CARE
I performed a face to face evaluation of this patient and performed a full history and physical examination on the patient.  I agree with the resident's history, physical examination, and plan of the patient. I performed a face to face evaluation of this patient and performed a full history and physical examination on the patient.  I agree with the resident's history, physical examination, and plan of the patient.  Pt with palpitations, rapid heart rate, chest tightness and diaphoresis, signed out AMA for rapid afib at [facility], awake, s1,s2 irregular, lung cta, abd soft nontender, neuro wnl, for labs, ekg, monitor conisder anticoagulation, and admit for rate control and further evaluation and monitoring

## 2018-07-17 NOTE — H&P ADULT - PMH
GERD (gastroesophageal reflux disease)    Hypothyroid COPD (chronic obstructive pulmonary disease)    GERD (gastroesophageal reflux disease)    Hypothyroid    ASHLI (obstructive sleep apnea)  dx'd 30 years ago, denies being on CPAP

## 2018-07-17 NOTE — ED PROVIDER NOTE - PHYSICAL EXAMINATION
Gen: Well appearing, well nourished, awake, alert, oriented to person, place, time/situation and in no apparent distress.  ENMT: Airway patent. Moist mucous membranes.  Cardiac: Tachycardic. Irregular rhythm. S1S2.  Respiratory: Breath sounds clear and equal bilaterally. No wheezes/rales/rhonchi.  Abdomen: Abdomen soft, non-distended, non-tender, no guarding.  Musculoskeletal: Atraumatic. No vascular compromise. No calf swelling/tenderness. No pedal edema.  Neuro: Alert, follows commands. Speech is clear, fluent, and appropriate. Moving all extremities spontaneously.  Skin: Skin normal color for race, warm, dry and intact. No evidence of rash.

## 2018-07-17 NOTE — H&P ADULT - NEGATIVE GENERAL GENITOURINARY SYMPTOMS
no urinary hesitancy/no flank pain L/no urine discoloration/no dysuria/no renal colic/no incontinence/no hematuria/no flank pain R/no bladder infections/no nocturia/no gas in urine

## 2018-07-17 NOTE — H&P ADULT - MUSCULOSKELETAL
details… detailed exam no joint swelling/normal strength/ROM intact/no joint erythema/no joint warmth/no calf tenderness/normal

## 2018-07-17 NOTE — H&P ADULT - ATTENDING COMMENTS
Pt seen and examined  agree with above  newly diagnosed afib  elevated ProBNP  suspect underlying myopathy and cad  has BECKER and Angina  smoker  plan   asa, heparin, BB  Echo  cath in am  if still in afib in near future will consider DCCV

## 2018-07-17 NOTE — SBIRT NOTE. - NSSBIRTSERVICES_GEN_A_ED_FT
Provided SBIRT services: Full screen positive. Brief Intervention Performed. Screening results were reviewed with the patient and patient was provided information about healthy guidelines and potential negative consequences associated with level of risk. Motivation and readiness to reduce or stop use was discussed and goals and activities to make changes were suggested/offered.  Audit Score: 1  DAST Score: 1  Duration = 20 Minutes

## 2018-07-17 NOTE — ED ADULT TRIAGE NOTE - CHIEF COMPLAINT QUOTE
"I have afib" "I left the other hospital"  "I feel like there is an elephant on my chest."  "I am diaphoretic."  c/o palps and sob.   abnormal ekg as per ER md.  pt directly to treatment area.   h/o newly diagnosed afib, COPD, hashimoto's,

## 2018-07-17 NOTE — H&P ADULT - NSHPSOCIALHISTORY_GEN_ALL_CORE
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  Tobacco- 45 pack years.  ETOH- <1 month.  Drugs- Marijuana 3 days ago. Has been using marijuana for many years. Single, lives with wife, working as IT and a musician.  Tobacco- 45 pack years.  Caffeine- 2 Liters of Pepsi daily for the last 2 months  ETOH- <1 month.  Drugs- Marijuana 3 days ago. Has been using marijuana for many years.

## 2018-07-17 NOTE — H&P ADULT - FAMILY HISTORY
Father  Still living? Unknown  Family history of heart disease, Age at diagnosis: Age Unknown     Grandparent  Still living? Unknown  Family history of heart disease, Age at diagnosis: Age Unknown     Mother  Still living? No  Family history of cerebrovascular accident (CVA), Age at diagnosis: Age Unknown

## 2018-07-18 LAB
ALBUMIN SERPL ELPH-MCNC: 3.6 G/DL — SIGNIFICANT CHANGE UP (ref 3.3–5)
ALP SERPL-CCNC: 66 U/L — SIGNIFICANT CHANGE UP (ref 40–120)
ALT FLD-CCNC: 30 U/L — SIGNIFICANT CHANGE UP (ref 4–41)
APTT BLD: 58.6 SEC — HIGH (ref 27.5–37.4)
AST SERPL-CCNC: 21 U/L — SIGNIFICANT CHANGE UP (ref 4–40)
BILIRUB SERPL-MCNC: 1.4 MG/DL — HIGH (ref 0.2–1.2)
BUN SERPL-MCNC: 12 MG/DL — SIGNIFICANT CHANGE UP (ref 7–23)
CALCIUM SERPL-MCNC: 8.6 MG/DL — SIGNIFICANT CHANGE UP (ref 8.4–10.5)
CHLORIDE SERPL-SCNC: 105 MMOL/L — SIGNIFICANT CHANGE UP (ref 98–107)
CHOLEST SERPL-MCNC: 139 MG/DL — SIGNIFICANT CHANGE UP (ref 120–199)
CO2 SERPL-SCNC: 23 MMOL/L — SIGNIFICANT CHANGE UP (ref 22–31)
CREAT SERPL-MCNC: 1.07 MG/DL — SIGNIFICANT CHANGE UP (ref 0.5–1.3)
GLUCOSE SERPL-MCNC: 115 MG/DL — HIGH (ref 70–99)
HBA1C BLD-MCNC: 5.4 % — SIGNIFICANT CHANGE UP (ref 4–5.6)
HCT VFR BLD CALC: 36.9 % — LOW (ref 39–50)
HDLC SERPL-MCNC: 40 MG/DL — SIGNIFICANT CHANGE UP (ref 35–55)
HGB BLD-MCNC: 12 G/DL — LOW (ref 13–17)
LIPID PNL WITH DIRECT LDL SERPL: 91 MG/DL — SIGNIFICANT CHANGE UP
MAGNESIUM SERPL-MCNC: 2.2 MG/DL — SIGNIFICANT CHANGE UP (ref 1.6–2.6)
MCHC RBC-ENTMCNC: 30.3 PG — SIGNIFICANT CHANGE UP (ref 27–34)
MCHC RBC-ENTMCNC: 32.5 % — SIGNIFICANT CHANGE UP (ref 32–36)
MCV RBC AUTO: 93.2 FL — SIGNIFICANT CHANGE UP (ref 80–100)
NRBC # FLD: 0 — SIGNIFICANT CHANGE UP
PHOSPHATE SERPL-MCNC: 2.7 MG/DL — SIGNIFICANT CHANGE UP (ref 2.5–4.5)
PLATELET # BLD AUTO: 201 K/UL — SIGNIFICANT CHANGE UP (ref 150–400)
PMV BLD: 11.2 FL — SIGNIFICANT CHANGE UP (ref 7–13)
POTASSIUM SERPL-MCNC: 3.8 MMOL/L — SIGNIFICANT CHANGE UP (ref 3.5–5.3)
POTASSIUM SERPL-SCNC: 3.8 MMOL/L — SIGNIFICANT CHANGE UP (ref 3.5–5.3)
PROT SERPL-MCNC: 6.7 G/DL — SIGNIFICANT CHANGE UP (ref 6–8.3)
RBC # BLD: 3.96 M/UL — LOW (ref 4.2–5.8)
RBC # FLD: 12.9 % — SIGNIFICANT CHANGE UP (ref 10.3–14.5)
SODIUM SERPL-SCNC: 141 MMOL/L — SIGNIFICANT CHANGE UP (ref 135–145)
TRIGL SERPL-MCNC: 89 MG/DL — SIGNIFICANT CHANGE UP (ref 10–149)
WBC # BLD: 8.3 K/UL — SIGNIFICANT CHANGE UP (ref 3.8–10.5)
WBC # FLD AUTO: 8.3 K/UL — SIGNIFICANT CHANGE UP (ref 3.8–10.5)

## 2018-07-18 PROCEDURE — 92921: CPT | Mod: LD

## 2018-07-18 PROCEDURE — 76937 US GUIDE VASCULAR ACCESS: CPT | Mod: 26

## 2018-07-18 PROCEDURE — 99152 MOD SED SAME PHYS/QHP 5/>YRS: CPT

## 2018-07-18 PROCEDURE — 93010 ELECTROCARDIOGRAM REPORT: CPT | Mod: 59

## 2018-07-18 PROCEDURE — 93010 ELECTROCARDIOGRAM REPORT: CPT

## 2018-07-18 PROCEDURE — 93571 IV DOP VEL&/PRESS C FLO 1ST: CPT | Mod: 26,LD

## 2018-07-18 PROCEDURE — 92928 PRQ TCAT PLMT NTRAC ST 1 LES: CPT | Mod: LD

## 2018-07-18 PROCEDURE — 93458 L HRT ARTERY/VENTRICLE ANGIO: CPT | Mod: 26,59

## 2018-07-18 PROCEDURE — 99233 SBSQ HOSP IP/OBS HIGH 50: CPT

## 2018-07-18 RX ORDER — METOPROLOL TARTRATE 50 MG
5 TABLET ORAL ONCE
Qty: 0 | Refills: 0 | Status: COMPLETED | OUTPATIENT
Start: 2018-07-18 | End: 2018-07-18

## 2018-07-18 RX ORDER — TICAGRELOR 90 MG/1
90 TABLET ORAL
Qty: 0 | Refills: 0 | Status: DISCONTINUED | OUTPATIENT
Start: 2018-07-18 | End: 2018-07-19

## 2018-07-18 RX ORDER — DILTIAZEM HCL 120 MG
10 CAPSULE, EXT RELEASE 24 HR ORAL
Qty: 125 | Refills: 0 | Status: DISCONTINUED | OUTPATIENT
Start: 2018-07-18 | End: 2018-07-18

## 2018-07-18 RX ORDER — NICOTINE POLACRILEX 2 MG
1 GUM BUCCAL
Qty: 0 | Refills: 0 | Status: DISCONTINUED | OUTPATIENT
Start: 2018-07-18 | End: 2018-07-19

## 2018-07-18 RX ORDER — APIXABAN 2.5 MG/1
5 TABLET, FILM COATED ORAL ONCE
Qty: 0 | Refills: 0 | Status: COMPLETED | OUTPATIENT
Start: 2018-07-18 | End: 2018-07-18

## 2018-07-18 RX ORDER — ALPRAZOLAM 0.25 MG
0.25 TABLET ORAL DAILY
Qty: 0 | Refills: 0 | Status: DISCONTINUED | OUTPATIENT
Start: 2018-07-18 | End: 2018-07-19

## 2018-07-18 RX ORDER — ALPRAZOLAM 0.25 MG
0.25 TABLET ORAL ONCE
Qty: 0 | Refills: 0 | Status: DISCONTINUED | OUTPATIENT
Start: 2018-07-18 | End: 2018-07-18

## 2018-07-18 RX ORDER — NICOTINE POLACRILEX 2 MG
1 GUM BUCCAL
Qty: 0 | Refills: 0 | Status: DISCONTINUED | OUTPATIENT
Start: 2018-07-18 | End: 2018-07-18

## 2018-07-18 RX ORDER — DILTIAZEM HCL 120 MG
10 CAPSULE, EXT RELEASE 24 HR ORAL
Qty: 125 | Refills: 0 | Status: DISCONTINUED | OUTPATIENT
Start: 2018-07-18 | End: 2018-07-19

## 2018-07-18 RX ADMIN — Medication 10 MG/HR: at 13:52

## 2018-07-18 RX ADMIN — Medication 0.25 MILLIGRAM(S): at 23:13

## 2018-07-18 RX ADMIN — Medication 25 MILLIGRAM(S): at 00:00

## 2018-07-18 RX ADMIN — TICAGRELOR 90 MILLIGRAM(S): 90 TABLET ORAL at 18:03

## 2018-07-18 RX ADMIN — HEPARIN SODIUM 1400 UNIT(S)/HR: 5000 INJECTION INTRAVENOUS; SUBCUTANEOUS at 06:46

## 2018-07-18 RX ADMIN — APIXABAN 5 MILLIGRAM(S): 2.5 TABLET, FILM COATED ORAL at 23:15

## 2018-07-18 RX ADMIN — Medication 81 MILLIGRAM(S): at 08:55

## 2018-07-18 RX ADMIN — Medication 5 MILLIGRAM(S): at 10:09

## 2018-07-18 RX ADMIN — Medication 25 MILLIGRAM(S): at 04:29

## 2018-07-18 RX ADMIN — Medication 0.25 MILLIGRAM(S): at 04:29

## 2018-07-18 RX ADMIN — Medication 150 MICROGRAM(S): at 18:03

## 2018-07-18 RX ADMIN — PANTOPRAZOLE SODIUM 40 MILLIGRAM(S): 20 TABLET, DELAYED RELEASE ORAL at 05:52

## 2018-07-18 RX ADMIN — ALBUTEROL 2 PUFF(S): 90 AEROSOL, METERED ORAL at 10:30

## 2018-07-18 NOTE — PROGRESS NOTE ADULT - PROBLEM SELECTOR PLAN 6
Check FOBT due to irregular stool shape and diarrhea. Consider GI eval maybe done as outpt if stable

## 2018-07-18 NOTE — CONSULT NOTE ADULT - SUBJECTIVE AND OBJECTIVE BOX
HPI/CC: Asked by Dr. Sagastume to see this patient for symptomatic rapid AFib.  This is a 59 yo male, a current smoker with a PMH of Hashimoto's thyroid, COPD, ASHLI and GERD who presented to [facility] ED on 7/17/2018 with a cc of intermittent chest heaviness and SOB; found to be in rapid atrial fibrillation with RVR in the ED at 119 bpm.  s/p cath today with PCI to the LAD.  According to the patient, his ROS is positive for palpitations associated with chest pain; he is non compliant with medical follow up and has not seen a medical provider in "years".  He is requesting a cigarette despite knowing the consequences of smoking.    PAST MEDICAL & SURGICAL HISTORY:  COPD (chronic obstructive pulmonary disease)  ASHLI (obstructive sleep apnea): dx 30 years ago, denies being on CPAP  Hypothyroid  GERD (gastroesophageal reflux disease)  History of appendectomy: At age 17    FAMILY HISTORY:  Family history of cerebrovascular accident (CVA) (Mother)  Family history of heart disease (Father, Grandparent)    DRUG ALLERGIES:  No Known Allergies    MEDICATIONS:  aspirin enteric coated 81 milliGRAM(s) Oral daily  diltiazem Infusion 10 mG/Hr IV Continuous <Continuous>  ticagrelor 90 milliGRAM(s) Oral two times a day    SOCIAL HISTORY:    Denies illicit drug use or tobacco use  Denies alcohol 	    REVIEW OF SYSTEMS:  CONSTITUTIONAL: No fever, weight loss, or fatigue  EYES: No eye pain, visual disturbances, or discharge  ENMT:  No difficulty hearing, tinnitus, vertigo; No sinus or throat pain  NECK: No pain or stiffness  RESPIRATORY: No cough, wheezing, chills or hemoptysis; No Shortness of Breath  CARDIOVASCULAR: No chest pain, +palpitations, no passing out, dizziness, or leg swelling  GASTROINTESTINAL: No abdominal or epigastric pain. No nausea, vomiting, or hematemesis; No diarrhea or constipation. No melena or hematochezia.  GENITOURINARY: No dysuria, frequency, hematuria, or incontinence  NEUROLOGICAL: No headaches, memory loss, loss of strength, numbness, or tremors  SKIN: No itching, burning, rashes, or lesions   LYMPH Nodes: No enlarged glands  ENDOCRINE: No heat or cold intolerance; No hair loss  MUSCULOSKELETAL: No joint pain or swelling; No muscle, back, or extremity pain  PSYCHIATRIC: No depression, anxiety, mood swings, or difficulty sleeping  HEME/LYMPH: No easy bruising, or bleeding gums  ALLERGY AND IMMUNOLOGIC: No hives or eczema	  All others negative	    PHYSICAL EXAM:  T(C): 36.9 (07-18-18 @ 15:15), Max: 37 (07-17-18 @ 20:46)  HR: 115 (07-18-18 @ 15:15) (64 - 152)  BP: 126/81 (07-18-18 @ 15:15) (112/90 - 133/86)  RR: 20 (07-18-18 @ 15:15) (18 - 20)  SpO2: 97% (07-18-18 @ 15:15) (94% - 98%)  Wt(kg): --  I&O's Summary    17 Jul 2018 07:01  -  18 Jul 2018 07:00  --------------------------------------------------------  IN: 184 mL / OUT: 200 mL / NET: -16 mL    18 Jul 2018 07:01  -  18 Jul 2018 16:31  --------------------------------------------------------  IN: 0 mL / OUT: 600 mL / NET: -600 mL    Appearance: Normal	  HEENT:   Normal oral mucosa, PERRL, EOMI	  Lymphatic: No lymphadenopathy  Cardiovascular: Normal S1 S2, No JVD, No murmurs, No edema  Chest: clear  Respiratory: Lungs clear to auscultation	  Psychiatry: A & O x 3, Mood & affect appropriate  Gastrointestinal:  Soft, Non-tender, + BS; obese	  Skin: No rashes, No ecchymoses, No cyanosis	  Neurologic: Non-focal  Extremities: Normal range of motion, No clubbing, cyanosis or edema  Vascular: Peripheral pulses palpable 2+ bilaterally    DIAGNOSTICS:  Tele: AFib, RVR 130s  CATH: N/A  2D TTE: N/A  ASHLIE: N/A	  RADIOLOGY:   Portable upright AP chest from 7/17/2018 at 1353. Compared to prior study   from 7/16/2018.  IMPRESSION:  External pacer pads currently overlie mid and lower left hemithorax.  Clear lungs. No pleural effusions or pneumothorax.  Stable cardiac and mediastinal silhouettes.  Trachea midline.  Unremarkable osseous structures.  OTHER: N/A    LABS:	 	    CARDIAC MARKERS:                        12.0   8.30  )-----------( 201      ( 18 Jul 2018 04:50 )             36.9     07-18    141  |  105  |  12  ----------------------------<  115<H>  3.8   |  23  |  1.07    Ca    8.6      18 Jul 2018 04:50  Phos  2.7     07-18  Mg     2.2     07-18    TPro  6.7  /  Alb  3.6  /  TBili  1.4<H>  /  DBili  x   /  AST  21  /  ALT  30  /  AlkPhos  66  07-18    proBNP: N/A  Lipid Profile: N/A  HgA1c: Hemoglobin A1C, Whole Blood: 5.4 % (07-18 @ 04:50)    TSH: N/A

## 2018-07-18 NOTE — PROGRESS NOTE ADULT - SUBJECTIVE AND OBJECTIVE BOX
Subjective: Patient seen and examined. No new events except as noted.     SUBJECTIVE/ROS:  feels better       MEDICATIONS:  MEDICATIONS  (STANDING):  aspirin enteric coated 81 milliGRAM(s) Oral daily  heparin  Infusion. 1400 Unit(s)/Hr (14 mL/Hr) IV Continuous <Continuous>  levothyroxine 300 MICROGram(s) Oral <User Schedule>  levothyroxine 150 MICROGram(s) Oral <User Schedule>  metoprolol tartrate 25 milliGRAM(s) Oral every 6 hours  pantoprazole    Tablet 40 milliGRAM(s) Oral before breakfast      PHYSICAL EXAM:  T(C): 36.4 (07-18-18 @ 09:04), Max: 37.1 (07-17-18 @ 15:02)  HR: 64 (07-18-18 @ 09:04) (64 - 180)  BP: 131/90 (07-18-18 @ 09:04) (112/90 - 142/101)  RR: 19 (07-18-18 @ 09:04) (18 - 22)  SpO2: 94% (07-18-18 @ 09:04) (94% - 99%)  Wt(kg): --  I&O's Summary    17 Jul 2018 07:01  -  18 Jul 2018 07:00  --------------------------------------------------------  IN: 184 mL / OUT: 200 mL / NET: -16 mL      Height (cm): 175.26 (07-18 @ 02:23)  Weight (kg): 98 (07-17 @ 17:05)  BMI (kg/m2): 31.9 (07-18 @ 02:23)  BSA (m2): 2.13 (07-18 @ 02:23)    Appearance: Normal	  HEENT:   Normal oral mucosa, PERRL, EOMI	  Cardiovascular: Normal S1 S2,    Murmur:   Neck: JVP normal  Respiratory: Lungs clear to auscultation  Gastrointestinal:  Soft, Non-tender, + BS	  Skin: normal   Neuro: No gross deficits.   Psychiatry:  Mood & affect appropriate  Ext: No edema      LABS/DATA:    CARDIAC MARKERS:  CARDIAC MARKERS ( 16 Jul 2018 19:57 )  <0.015 ng/mL / x     / 297 U/L / x     / 1.3 ng/mL  CARDIAC MARKERS ( 16 Jul 2018 13:15 )  <0.015 ng/mL / x     / 271 U/L / x     / 1.1 ng/mL                                12.0   8.30  )-----------( 201      ( 18 Jul 2018 04:50 )             36.9     07-18    141  |  105  |  12  ----------------------------<  115<H>  3.8   |  23  |  1.07    Ca    8.6      18 Jul 2018 04:50  Phos  2.7     07-18  Mg     2.2     07-18    TPro  6.7  /  Alb  3.6  /  TBili  1.4<H>  /  DBili  x   /  AST  21  /  ALT  30  /  AlkPhos  66  07-18    proBNP:   Lipid Profile:   HgA1c: Hemoglobin A1C, Whole Blood: 5.4 % (07-18 @ 04:50)    TSH: Thyroid Stimulating Hormone, Serum: 3.47 uIU/mL (07-17 @ 13:08)      TELE:  EKG:

## 2018-07-18 NOTE — PROGRESS NOTE ADULT - ASSESSMENT
59 y/o male with a PmHx of Hashimoto's thyroid, COPD, ASHLI and GERD, presented to [facility] ED with intermittent chest heaviness and SOB was found to have atrial fibrillation with RVR in the ED.  EKG- Afib @ 119

## 2018-07-18 NOTE — CONSULT NOTE ADULT - ASSESSMENT
59 yo male, a current smoker with a PMH of Hashimoto's thyroid, COPD, ASHLI and GERD who presented to [facility] ED on 7/17/2018 with a cc of intermittent chest heaviness and SOB; found to be in rapid atrial fibrillation with RVR that currently appears persistent, now s/p PCI to the LAD, [id number] Vasc, 1:    - continue IV Diltiazem, for now  - 2D TTE pending  - keep euthyroid  - smoking cessation   - outpt management of ASHLI per primary/pulmonary  - NPO after MN for ASHLIE/DCCV  - systemic AC with Eliquis 5mg PO BID, first dose now please  - we will follow; d/w Dr. Fields

## 2018-07-19 ENCOUNTER — TRANSCRIPTION ENCOUNTER (OUTPATIENT)
Age: 58
End: 2018-07-19

## 2018-07-19 VITALS
OXYGEN SATURATION: 100 % | TEMPERATURE: 99 F | HEART RATE: 84 BPM | SYSTOLIC BLOOD PRESSURE: 136 MMHG | RESPIRATION RATE: 19 BRPM | DIASTOLIC BLOOD PRESSURE: 90 MMHG

## 2018-07-19 PROBLEM — G47.33 OBSTRUCTIVE SLEEP APNEA (ADULT) (PEDIATRIC): Chronic | Status: ACTIVE | Noted: 2018-07-17

## 2018-07-19 PROBLEM — Z00.00 ENCOUNTER FOR PREVENTIVE HEALTH EXAMINATION: Status: ACTIVE | Noted: 2018-07-19

## 2018-07-19 PROBLEM — K21.9 GASTRO-ESOPHAGEAL REFLUX DISEASE WITHOUT ESOPHAGITIS: Chronic | Status: ACTIVE | Noted: 2018-07-16

## 2018-07-19 PROBLEM — E03.9 HYPOTHYROIDISM, UNSPECIFIED: Chronic | Status: ACTIVE | Noted: 2018-07-16

## 2018-07-19 PROBLEM — J44.9 CHRONIC OBSTRUCTIVE PULMONARY DISEASE, UNSPECIFIED: Chronic | Status: ACTIVE | Noted: 2018-07-17

## 2018-07-19 LAB
BASOPHILS # BLD AUTO: 0.09 K/UL — SIGNIFICANT CHANGE UP (ref 0–0.2)
BASOPHILS NFR BLD AUTO: 0.9 % — SIGNIFICANT CHANGE UP (ref 0–2)
BUN SERPL-MCNC: 16 MG/DL — SIGNIFICANT CHANGE UP (ref 7–23)
CALCIUM SERPL-MCNC: 8.6 MG/DL — SIGNIFICANT CHANGE UP (ref 8.4–10.5)
CHLORIDE SERPL-SCNC: 102 MMOL/L — SIGNIFICANT CHANGE UP (ref 98–107)
CO2 SERPL-SCNC: 23 MMOL/L — SIGNIFICANT CHANGE UP (ref 22–31)
CREAT SERPL-MCNC: 1.2 MG/DL — SIGNIFICANT CHANGE UP (ref 0.5–1.3)
EOSINOPHIL # BLD AUTO: 0 K/UL — SIGNIFICANT CHANGE UP (ref 0–0.5)
EOSINOPHIL NFR BLD AUTO: 0 % — SIGNIFICANT CHANGE UP (ref 0–6)
GLUCOSE SERPL-MCNC: 101 MG/DL — HIGH (ref 70–99)
HCT VFR BLD CALC: 38.8 % — LOW (ref 39–50)
HCT VFR BLD CALC: 38.8 % — LOW (ref 39–50)
HGB BLD-MCNC: 12.7 G/DL — LOW (ref 13–17)
HGB BLD-MCNC: 12.7 G/DL — LOW (ref 13–17)
IMM GRANULOCYTES # BLD AUTO: 0.05 # — SIGNIFICANT CHANGE UP
IMM GRANULOCYTES NFR BLD AUTO: 0.5 % — SIGNIFICANT CHANGE UP (ref 0–1.5)
LYMPHOCYTES # BLD AUTO: 2.06 K/UL — SIGNIFICANT CHANGE UP (ref 1–3.3)
LYMPHOCYTES # BLD AUTO: 20 % — SIGNIFICANT CHANGE UP (ref 13–44)
MAGNESIUM SERPL-MCNC: 2.1 MG/DL — SIGNIFICANT CHANGE UP (ref 1.6–2.6)
MCHC RBC-ENTMCNC: 30.5 PG — SIGNIFICANT CHANGE UP (ref 27–34)
MCHC RBC-ENTMCNC: 30.5 PG — SIGNIFICANT CHANGE UP (ref 27–34)
MCHC RBC-ENTMCNC: 32.7 % — SIGNIFICANT CHANGE UP (ref 32–36)
MCHC RBC-ENTMCNC: 32.7 % — SIGNIFICANT CHANGE UP (ref 32–36)
MCV RBC AUTO: 93.3 FL — SIGNIFICANT CHANGE UP (ref 80–100)
MCV RBC AUTO: 93.3 FL — SIGNIFICANT CHANGE UP (ref 80–100)
MONOCYTES # BLD AUTO: 1.07 K/UL — HIGH (ref 0–0.9)
MONOCYTES NFR BLD AUTO: 10.4 % — SIGNIFICANT CHANGE UP (ref 2–14)
NEUTROPHILS # BLD AUTO: 7.01 K/UL — SIGNIFICANT CHANGE UP (ref 1.8–7.4)
NEUTROPHILS NFR BLD AUTO: 68.2 % — SIGNIFICANT CHANGE UP (ref 43–77)
NRBC # FLD: 0 — SIGNIFICANT CHANGE UP
NRBC # FLD: 0 — SIGNIFICANT CHANGE UP
PLATELET # BLD AUTO: 235 K/UL — SIGNIFICANT CHANGE UP (ref 150–400)
PLATELET # BLD AUTO: 235 K/UL — SIGNIFICANT CHANGE UP (ref 150–400)
PMV BLD: 11.3 FL — SIGNIFICANT CHANGE UP (ref 7–13)
PMV BLD: 11.3 FL — SIGNIFICANT CHANGE UP (ref 7–13)
POTASSIUM SERPL-MCNC: 3.9 MMOL/L — SIGNIFICANT CHANGE UP (ref 3.5–5.3)
POTASSIUM SERPL-SCNC: 3.9 MMOL/L — SIGNIFICANT CHANGE UP (ref 3.5–5.3)
RBC # BLD: 4.16 M/UL — LOW (ref 4.2–5.8)
RBC # BLD: 4.16 M/UL — LOW (ref 4.2–5.8)
RBC # FLD: 13 % — SIGNIFICANT CHANGE UP (ref 10.3–14.5)
RBC # FLD: 13 % — SIGNIFICANT CHANGE UP (ref 10.3–14.5)
SODIUM SERPL-SCNC: 140 MMOL/L — SIGNIFICANT CHANGE UP (ref 135–145)
WBC # BLD: 10.28 K/UL — SIGNIFICANT CHANGE UP (ref 3.8–10.5)
WBC # BLD: 10.28 K/UL — SIGNIFICANT CHANGE UP (ref 3.8–10.5)
WBC # FLD AUTO: 10.28 K/UL — SIGNIFICANT CHANGE UP (ref 3.8–10.5)
WBC # FLD AUTO: 10.28 K/UL — SIGNIFICANT CHANGE UP (ref 3.8–10.5)

## 2018-07-19 PROCEDURE — 93010 ELECTROCARDIOGRAM REPORT: CPT | Mod: 59

## 2018-07-19 PROCEDURE — 93306 TTE W/DOPPLER COMPLETE: CPT | Mod: 26

## 2018-07-19 PROCEDURE — 93312 ECHO TRANSESOPHAGEAL: CPT | Mod: 26

## 2018-07-19 PROCEDURE — 92960 CARDIOVERSION ELECTRIC EXT: CPT

## 2018-07-19 RX ORDER — APIXABAN 2.5 MG/1
5 TABLET, FILM COATED ORAL EVERY 12 HOURS
Qty: 0 | Refills: 0 | Status: DISCONTINUED | OUTPATIENT
Start: 2018-07-19 | End: 2018-07-19

## 2018-07-19 RX ORDER — APIXABAN 2.5 MG/1
1 TABLET, FILM COATED ORAL
Qty: 60 | Refills: 0 | OUTPATIENT
Start: 2018-07-19 | End: 2018-08-17

## 2018-07-19 RX ORDER — CLOPIDOGREL BISULFATE 75 MG/1
1 TABLET, FILM COATED ORAL
Qty: 30 | Refills: 0 | OUTPATIENT
Start: 2018-07-19 | End: 2018-08-17

## 2018-07-19 RX ORDER — ASPIRIN/CALCIUM CARB/MAGNESIUM 324 MG
1 TABLET ORAL
Qty: 0 | Refills: 0 | COMMUNITY
Start: 2018-07-19

## 2018-07-19 RX ORDER — CLOPIDOGREL BISULFATE 75 MG/1
75 TABLET, FILM COATED ORAL DAILY
Qty: 0 | Refills: 0 | Status: DISCONTINUED | OUTPATIENT
Start: 2018-07-19 | End: 2018-07-19

## 2018-07-19 RX ADMIN — PANTOPRAZOLE SODIUM 40 MILLIGRAM(S): 20 TABLET, DELAYED RELEASE ORAL at 06:02

## 2018-07-19 RX ADMIN — CLOPIDOGREL BISULFATE 75 MILLIGRAM(S): 75 TABLET, FILM COATED ORAL at 13:01

## 2018-07-19 RX ADMIN — Medication 81 MILLIGRAM(S): at 13:01

## 2018-07-19 RX ADMIN — APIXABAN 5 MILLIGRAM(S): 2.5 TABLET, FILM COATED ORAL at 13:33

## 2018-07-19 RX ADMIN — TICAGRELOR 90 MILLIGRAM(S): 90 TABLET ORAL at 06:02

## 2018-07-19 NOTE — PROGRESS NOTE ADULT - SUBJECTIVE AND OBJECTIVE BOX
Patient seen and evaluated in anticipation of ASHLIE/DCCV with Dr. Fields; received Eliquis 5mg orally last evening.  No significant events overnight, has remained hemodynamically stable.  Consents signed.  All questions answered to patient's satisfaction.  Please see EP Preprocedure for the physical exam portion of this visit.

## 2018-07-19 NOTE — DISCHARGE NOTE ADULT - HOSPITAL COURSE
59 y/o male with a PmHx of Hashimoto's thyroid, COPD, ASHLI and GERD, presented to [facility] ED with intermittent chest heaviness and SOB was found to have atrial fibrillation with RVR in the ED.  EKG- Afib @ 119  Atrial fibrillation with rapid ventricular response.  Plan: HR better  cont BB  ECHO  suspect mild myopathy   a/c.   ACS (acute coronary syndrome).  Plan: chest pain with activity  cath today.   COPD (chronic obstructive pulmonary disease).  Plan: Continue ventolin prn.   Hypothyroid.  Plan: Continue Synthroid, TFT's done.   GERD (gastroesophageal reflux disease).  Plan: Continue omeprazole.   Diarrhea. Plan: Check FOBT due to irregular stool shape and diarrhea. Consider GI eval maybe done as outpt if stable.    F/U with Dr. Fields on August 6, 2018 11:30am   As per attending, patient stable for discharge.

## 2018-07-19 NOTE — PROGRESS NOTE ADULT - SUBJECTIVE AND OBJECTIVE BOX
s/p ASHLIE/DCCV for Afib; F/U with Dr. Fields on August 6, 2018 11:30am Monday.  F/U appointment card will be given to the patient.

## 2018-07-19 NOTE — DISCHARGE NOTE ADULT - CARE PLAN
Principal Discharge DX:	Atrial fibrillation with rapid ventricular response  Goal:	Followup with PMD and take all medications prescribed.  Assessment and plan of treatment:	Followup with PMD and take all medications prescribed. Low salt, low fat, low cholesterol diet  F/U with Dr. Fields on August 6, 2018 11:30am  Secondary Diagnosis:	COPD (chronic obstructive pulmonary disease)  Goal:	Followup with PMD and take all medications prescribed.  Assessment and plan of treatment:	Followup with PMD and take all medications prescribed. Low salt, low fat, low cholesterol diet  Secondary Diagnosis:	Gastroesophageal reflux disease without esophagitis  Goal:	Followup with PMD and take all medications prescribed.  Assessment and plan of treatment:	Followup with PMD and take all medications prescribed. Low salt, low fat, low cholesterol diet  Secondary Diagnosis:	Hypothyroid  Goal:	Followup with PMD and take all medications prescribed.  Assessment and plan of treatment:	Followup with PMD and take all medications prescribed. Low salt, low fat, low cholesterol diet

## 2018-07-19 NOTE — DISCHARGE NOTE ADULT - MEDICATION SUMMARY - MEDICATIONS TO TAKE
I will START or STAY ON the medications listed below when I get home from the hospital:    aspirin 81 mg oral delayed release tablet  -- 1 tab(s) by mouth once a day  -- Indication: For Heart protective    apixaban 5 mg oral tablet  -- 1 tab(s) by mouth every 12 hours  -- Indication: For Atrial fibrillation    clopidogrel 75 mg oral tablet  -- 1 tab(s) by mouth once a day  -- Indication: For Atrial fibrillation    Ventolin 90 mcg/inh inhalation aerosol with adapter  -- 1 puff(s) inhaled every 6 hours, As Needed  -- Indication: For COPD (chronic obstructive pulmonary disease)    omeprazole 20 mg oral delayed release capsule  -- 1 cap(s) by mouth once a day  -- Indication: For GERD (gastroesophageal reflux disease)    levothyroxine 150 mcg (0.15 mg) oral capsule  -- 1 cap(s) by mouth 6 times a week (Monday thru Saturday)  -- Indication: For Hypothyroid    levothyroxine 150 mcg (0.15 mg) oral capsule  -- 2 cap(s) by mouth every 7 days (Sunday)  -- Indication: For Hypothyroid

## 2018-07-19 NOTE — DISCHARGE NOTE ADULT - CARE PROVIDER_API CALL
Jose Sagastume), Cardiovascular Disease; Internal Medicine  [address] [address]  Phone: [phone]  Fax: [phone]    Kevan Fields), Cardiac Electrophysiology; Cardiology; Internal Medicine  [address] [place] [address]  Phone: [phone]  Fax: [phone]

## 2018-07-19 NOTE — DISCHARGE NOTE ADULT - ADDITIONAL INSTRUCTIONS
Follow up with Cardiologist and PMD within one week of discharge. Call for appointment. Return to ED for any concerning symptoms. Continue medications as prescribed. Low salt, low fat, low cholesterol diet. F/U with Dr. Fields on August 6, 2018 11:30am

## 2018-07-19 NOTE — DISCHARGE NOTE ADULT - SECONDARY DIAGNOSIS.
COPD (chronic obstructive pulmonary disease) Gastroesophageal reflux disease without esophagitis Hypothyroid

## 2018-07-19 NOTE — DISCHARGE NOTE ADULT - PLAN OF CARE
Followup with PMD and take all medications prescribed. Followup with PMD and take all medications prescribed. Low salt, low fat, low cholesterol diet  F/U with Dr. Fields on August 6, 2018 11:30am Followup with PMD and take all medications prescribed. Low salt, low fat, low cholesterol diet

## 2018-07-19 NOTE — DISCHARGE NOTE ADULT - PATIENT PORTAL LINK FT
You can access the [facility] Patient Portal, offered by [facility], by registering with the following website: [email or web address]

## 2018-07-21 LAB — T4 FREE SERPL-MCNC: 1.75 NG/DL — SIGNIFICANT CHANGE UP

## 2018-08-06 ENCOUNTER — TRANSCRIPTION ENCOUNTER (OUTPATIENT)
Age: 58
End: 2018-08-06

## 2018-08-06 ENCOUNTER — APPOINTMENT (OUTPATIENT)
Dept: ELECTROPHYSIOLOGY | Facility: CLINIC | Age: 58
End: 2018-08-06
Payer: COMMERCIAL

## 2018-08-06 VITALS
HEART RATE: 114 BPM | OXYGEN SATURATION: 99 % | RESPIRATION RATE: 16 BRPM | BODY MASS INDEX: 31.99 KG/M2 | WEIGHT: 216 LBS | DIASTOLIC BLOOD PRESSURE: 83 MMHG | SYSTOLIC BLOOD PRESSURE: 128 MMHG | HEIGHT: 69 IN

## 2018-08-06 DIAGNOSIS — K21.9 GASTRO-ESOPHAGEAL REFLUX DISEASE W/OUT ESOPHAGITIS: ICD-10-CM

## 2018-08-06 DIAGNOSIS — Z86.39 PERSONAL HISTORY OF OTHER ENDOCRINE, NUTRITIONAL AND METABOLIC DISEASE: ICD-10-CM

## 2018-08-06 DIAGNOSIS — J44.9 CHRONIC OBSTRUCTIVE PULMONARY DISEASE, UNSPECIFIED: ICD-10-CM

## 2018-08-06 DIAGNOSIS — F17.200 NICOTINE DEPENDENCE, UNSPECIFIED, UNCOMPLICATED: ICD-10-CM

## 2018-08-06 DIAGNOSIS — Z80.42 FAMILY HISTORY OF MALIGNANT NEOPLASM OF PROSTATE: ICD-10-CM

## 2018-08-06 DIAGNOSIS — I48.0 PAROXYSMAL ATRIAL FIBRILLATION: ICD-10-CM

## 2018-08-06 DIAGNOSIS — Z82.49 FAMILY HISTORY OF ISCHEMIC HEART DISEASE AND OTHER DISEASES OF THE CIRCULATORY SYSTEM: ICD-10-CM

## 2018-08-06 PROCEDURE — 93000 ELECTROCARDIOGRAM COMPLETE: CPT

## 2018-08-06 PROCEDURE — 99215 OFFICE O/P EST HI 40 MIN: CPT

## 2018-08-06 RX ORDER — OMEPRAZOLE 20 MG/1
20 CAPSULE, DELAYED RELEASE ORAL
Refills: 2 | Status: ACTIVE | COMMUNITY
Start: 2018-08-06

## 2018-08-06 RX ORDER — LEVOTHYROXINE SODIUM 0.15 MG/1
150 TABLET ORAL DAILY
Refills: 0 | Status: ACTIVE | COMMUNITY
Start: 2018-08-06

## 2018-08-06 RX ORDER — ALBUTEROL SULFATE 90 UG/1
108 (90 BASE) AEROSOL, METERED RESPIRATORY (INHALATION) EVERY 4 HOURS
Refills: 0 | Status: ACTIVE | COMMUNITY
Start: 2018-08-06

## 2018-08-09 ENCOUNTER — OUTPATIENT (OUTPATIENT)
Dept: OUTPATIENT SERVICES | Facility: HOSPITAL | Age: 58
LOS: 1 days | Discharge: HOME | End: 2018-08-09
Payer: COMMERCIAL

## 2018-08-09 DIAGNOSIS — I48.0 PAROXYSMAL ATRIAL FIBRILLATION: ICD-10-CM

## 2018-08-09 DIAGNOSIS — Z90.49 ACQUIRED ABSENCE OF OTHER SPECIFIED PARTS OF DIGESTIVE TRACT: Chronic | ICD-10-CM

## 2018-08-09 LAB
BUN SERPL-MCNC: 14 MG/DL — SIGNIFICANT CHANGE UP (ref 7–23)
CALCIUM SERPL-MCNC: 9.3 MG/DL — SIGNIFICANT CHANGE UP (ref 8.4–10.5)
CHLORIDE SERPL-SCNC: 104 MMOL/L — SIGNIFICANT CHANGE UP (ref 98–107)
CO2 SERPL-SCNC: 25 MMOL/L — SIGNIFICANT CHANGE UP (ref 22–31)
CREAT SERPL-MCNC: 1.21 MG/DL — SIGNIFICANT CHANGE UP (ref 0.5–1.3)
GLUCOSE SERPL-MCNC: 98 MG/DL — SIGNIFICANT CHANGE UP (ref 70–99)
HCT VFR BLD CALC: 45.7 % — SIGNIFICANT CHANGE UP (ref 39–50)
HGB BLD-MCNC: 15 G/DL — SIGNIFICANT CHANGE UP (ref 13–17)
MCHC RBC-ENTMCNC: 30.8 PG — SIGNIFICANT CHANGE UP (ref 27–34)
MCHC RBC-ENTMCNC: 32.8 % — SIGNIFICANT CHANGE UP (ref 32–36)
MCV RBC AUTO: 93.8 FL — SIGNIFICANT CHANGE UP (ref 80–100)
NRBC # FLD: 0 — SIGNIFICANT CHANGE UP
PLATELET # BLD AUTO: 266 K/UL — SIGNIFICANT CHANGE UP (ref 150–400)
PMV BLD: 11.4 FL — SIGNIFICANT CHANGE UP (ref 7–13)
POTASSIUM SERPL-MCNC: 4.4 MMOL/L — SIGNIFICANT CHANGE UP (ref 3.5–5.3)
POTASSIUM SERPL-SCNC: 4.4 MMOL/L — SIGNIFICANT CHANGE UP (ref 3.5–5.3)
RBC # BLD: 4.87 M/UL — SIGNIFICANT CHANGE UP (ref 4.2–5.8)
RBC # FLD: 12.9 % — SIGNIFICANT CHANGE UP (ref 10.3–14.5)
SODIUM SERPL-SCNC: 140 MMOL/L — SIGNIFICANT CHANGE UP (ref 135–145)
WBC # BLD: 8.41 K/UL — SIGNIFICANT CHANGE UP (ref 3.8–10.5)
WBC # FLD AUTO: 8.41 K/UL — SIGNIFICANT CHANGE UP (ref 3.8–10.5)

## 2018-08-09 PROCEDURE — 92960 CARDIOVERSION ELECTRIC EXT: CPT

## 2018-08-09 PROCEDURE — 93010 ELECTROCARDIOGRAM REPORT: CPT

## 2018-08-09 RX ORDER — AMIODARONE HYDROCHLORIDE 400 MG/1
400 TABLET ORAL ONCE
Qty: 0 | Refills: 0 | Status: COMPLETED | OUTPATIENT
Start: 2018-08-09 | End: 2018-08-09

## 2018-08-09 RX ORDER — ALBUTEROL 90 UG/1
2 AEROSOL, METERED ORAL EVERY 6 HOURS
Qty: 0 | Refills: 0 | Status: DISCONTINUED | OUTPATIENT
Start: 2018-08-09 | End: 2018-08-24

## 2018-08-09 RX ORDER — AMIODARONE HYDROCHLORIDE 400 MG/1
1 TABLET ORAL
Qty: 11 | Refills: 0 | OUTPATIENT
Start: 2018-08-09 | End: 2018-08-14

## 2018-08-09 RX ORDER — LEVOTHYROXINE SODIUM 125 MCG
1 TABLET ORAL
Qty: 0 | Refills: 0 | COMMUNITY

## 2018-08-09 RX ORDER — SODIUM CHLORIDE 9 MG/ML
3 INJECTION INTRAMUSCULAR; INTRAVENOUS; SUBCUTANEOUS EVERY 8 HOURS
Qty: 0 | Refills: 0 | Status: DISCONTINUED | OUTPATIENT
Start: 2018-08-09 | End: 2018-08-24

## 2018-08-09 RX ORDER — APIXABAN 2.5 MG/1
5 TABLET, FILM COATED ORAL ONCE
Qty: 0 | Refills: 0 | Status: COMPLETED | OUTPATIENT
Start: 2018-08-09 | End: 2018-08-09

## 2018-08-09 RX ADMIN — APIXABAN 5 MILLIGRAM(S): 2.5 TABLET, FILM COATED ORAL at 10:40

## 2018-08-09 RX ADMIN — SODIUM CHLORIDE 3 MILLILITER(S): 9 INJECTION INTRAMUSCULAR; INTRAVENOUS; SUBCUTANEOUS at 14:30

## 2018-08-09 RX ADMIN — AMIODARONE HYDROCHLORIDE 400 MILLIGRAM(S): 400 TABLET ORAL at 14:48

## 2018-08-09 NOTE — CHART NOTE - NSCHARTNOTEFT_GEN_A_CORE
Type of Procedure: DCCV  Licensed independent practitioner: Kevan Fields MD  Assistant: None  Description of procedure: A 200J initial cardioversion was successful but with ERAF. Amio 150mg and Cardizem 10mg were given IV and over 10 minutes another 200J cardioversion was also with ERAF A final 200J cardioversion converted him to sinus but with again early recurrence of AF. Amio po loading was initated and the patient was discharged.  Findings of procedure: As above  Estimated blood loss: None  Specimen removed: N/A  Preoperative Dx: Afib  Postoperative Dx: Sinus  Complications: None  Anesthesia type: Conscious

## 2018-08-09 NOTE — H&P CARDIOLOGY - HISTORY OF PRESENT ILLNESS
58 y.o. male presents today for elective DCCV.  see hard copy of H&P from [employer] in patient's chart.  The patient denies any new complaints since the last time he was seen by Dr. Fields.

## 2018-08-09 NOTE — H&P CARDIOLOGY - REVIEW OF SYSTEMS
see H&P from [employer]  The patient denies any new complaints since the last time he was seen by Dr. Fields  The patient denies  melena, hematochezia, fever, chills, abdominal pain, nausea, vomiting. Admits to constipation.

## 2018-08-10 ENCOUNTER — OTHER (OUTPATIENT)
Age: 58
End: 2018-08-10

## 2018-08-12 ENCOUNTER — NON-APPOINTMENT (OUTPATIENT)
Age: 58
End: 2018-08-12

## 2018-08-15 RX ORDER — AMIODARONE HYDROCHLORIDE 400 MG/1
1 TABLET ORAL
Qty: 30 | Refills: 0 | OUTPATIENT
Start: 2018-08-15 | End: 2018-09-13

## 2018-08-16 ENCOUNTER — APPOINTMENT (OUTPATIENT)
Dept: ELECTROPHYSIOLOGY | Facility: CLINIC | Age: 58
End: 2018-08-16
Payer: COMMERCIAL

## 2018-08-16 ENCOUNTER — NON-APPOINTMENT (OUTPATIENT)
Age: 58
End: 2018-08-16

## 2018-08-16 VITALS
DIASTOLIC BLOOD PRESSURE: 77 MMHG | RESPIRATION RATE: 20 BRPM | HEART RATE: 104 BPM | HEIGHT: 68.5 IN | WEIGHT: 214 LBS | TEMPERATURE: 98.2 F | BODY MASS INDEX: 32.06 KG/M2 | SYSTOLIC BLOOD PRESSURE: 118 MMHG | OXYGEN SATURATION: 97 %

## 2018-08-16 DIAGNOSIS — R00.2 PALPITATIONS: ICD-10-CM

## 2018-08-16 DIAGNOSIS — R06.00 DYSPNEA, UNSPECIFIED: ICD-10-CM

## 2018-08-16 PROCEDURE — 93000 ELECTROCARDIOGRAM COMPLETE: CPT

## 2018-08-16 PROCEDURE — 99215 OFFICE O/P EST HI 40 MIN: CPT

## 2018-08-16 RX ORDER — NADOLOL 40 MG/1
40 TABLET ORAL DAILY
Refills: 0 | Status: DISCONTINUED | COMMUNITY
Start: 2018-08-06 | End: 2018-08-16

## 2018-08-24 ENCOUNTER — INPATIENT (INPATIENT)
Facility: HOSPITAL | Age: 58
LOS: 0 days | Discharge: HOME | End: 2018-08-25
Attending: STUDENT IN AN ORGANIZED HEALTH CARE EDUCATION/TRAINING PROGRAM | Admitting: STUDENT IN AN ORGANIZED HEALTH CARE EDUCATION/TRAINING PROGRAM
Payer: COMMERCIAL

## 2018-08-24 ENCOUNTER — TRANSCRIPTION ENCOUNTER (OUTPATIENT)
Age: 58
End: 2018-08-24

## 2018-08-24 VITALS
HEIGHT: 68 IN | TEMPERATURE: 96 F | SYSTOLIC BLOOD PRESSURE: 130 MMHG | DIASTOLIC BLOOD PRESSURE: 78 MMHG | HEART RATE: 80 BPM | WEIGHT: 219.14 LBS

## 2018-08-24 DIAGNOSIS — Z90.49 ACQUIRED ABSENCE OF OTHER SPECIFIED PARTS OF DIGESTIVE TRACT: Chronic | ICD-10-CM

## 2018-08-24 DIAGNOSIS — I48.0 PAROXYSMAL ATRIAL FIBRILLATION: ICD-10-CM

## 2018-08-24 LAB
APTT BLD: 27.5 SEC — SIGNIFICANT CHANGE UP (ref 27.5–37.4)
BASE EXCESS BLDA CALC-SCNC: -2.5 MMOL/L — SIGNIFICANT CHANGE UP
BASE EXCESS BLDA CALC-SCNC: -2.8 MMOL/L — SIGNIFICANT CHANGE UP
BASOPHILS # BLD AUTO: 0.07 K/UL — SIGNIFICANT CHANGE UP (ref 0–0.2)
BASOPHILS NFR BLD AUTO: 0.4 % — SIGNIFICANT CHANGE UP (ref 0–2)
BLD GP AB SCN SERPL QL: NEGATIVE — SIGNIFICANT CHANGE UP
BUN SERPL-MCNC: 21 MG/DL — SIGNIFICANT CHANGE UP (ref 7–23)
CALCIUM SERPL-MCNC: 8.6 MG/DL — SIGNIFICANT CHANGE UP (ref 8.4–10.5)
CHLORIDE BLDA-SCNC: 109 MMOL/L — HIGH (ref 96–108)
CHLORIDE SERPL-SCNC: 104 MMOL/L — SIGNIFICANT CHANGE UP (ref 98–107)
CO2 SERPL-SCNC: 24 MMOL/L — SIGNIFICANT CHANGE UP (ref 22–31)
CREAT BLDA-MCNC: 1.35 MG/DL — HIGH (ref 0.5–1.3)
CREAT SERPL-MCNC: 1.58 MG/DL — HIGH (ref 0.5–1.3)
EOSINOPHIL # BLD AUTO: 0 K/UL — SIGNIFICANT CHANGE UP (ref 0–0.5)
EOSINOPHIL NFR BLD AUTO: 0 % — SIGNIFICANT CHANGE UP (ref 0–6)
GLUCOSE BLDA-MCNC: 125 MG/DL — HIGH (ref 70–99)
GLUCOSE BLDA-MCNC: 143 MG/DL — HIGH (ref 70–99)
GLUCOSE SERPL-MCNC: 127 MG/DL — HIGH (ref 70–99)
HCO3 BLDA-SCNC: 20 MMOL/L — LOW (ref 22–26)
HCO3 BLDA-SCNC: 21 MMOL/L — LOW (ref 22–26)
HCT VFR BLD CALC: 39 % — SIGNIFICANT CHANGE UP (ref 39–50)
HCT VFR BLDA CALC: 45.6 % — SIGNIFICANT CHANGE UP (ref 39–51)
HCT VFR BLDA CALC: 46.2 % — SIGNIFICANT CHANGE UP (ref 39–51)
HGB BLD-MCNC: 13 G/DL — SIGNIFICANT CHANGE UP (ref 13–17)
HGB BLDA-MCNC: 14.9 G/DL — SIGNIFICANT CHANGE UP (ref 13–17)
HGB BLDA-MCNC: 15.1 G/DL — SIGNIFICANT CHANGE UP (ref 13–17)
IMM GRANULOCYTES # BLD AUTO: 0.11 # — SIGNIFICANT CHANGE UP
IMM GRANULOCYTES NFR BLD AUTO: 0.6 % — SIGNIFICANT CHANGE UP (ref 0–1.5)
INR BLD: 1.2 — HIGH (ref 0.88–1.17)
LACTATE BLDA-SCNC: 1.5 MMOL/L — SIGNIFICANT CHANGE UP (ref 0.5–2)
LYMPHOCYTES # BLD AUTO: 1.15 K/UL — SIGNIFICANT CHANGE UP (ref 1–3.3)
LYMPHOCYTES # BLD AUTO: 6.3 % — LOW (ref 13–44)
MAGNESIUM SERPL-MCNC: 1.7 MG/DL — SIGNIFICANT CHANGE UP (ref 1.6–2.6)
MCHC RBC-ENTMCNC: 31.3 PG — SIGNIFICANT CHANGE UP (ref 27–34)
MCHC RBC-ENTMCNC: 33.3 % — SIGNIFICANT CHANGE UP (ref 32–36)
MCV RBC AUTO: 93.8 FL — SIGNIFICANT CHANGE UP (ref 80–100)
MONOCYTES # BLD AUTO: 1.02 K/UL — HIGH (ref 0–0.9)
MONOCYTES NFR BLD AUTO: 5.6 % — SIGNIFICANT CHANGE UP (ref 2–14)
NEUTROPHILS # BLD AUTO: 15.98 K/UL — HIGH (ref 1.8–7.4)
NEUTROPHILS NFR BLD AUTO: 87.1 % — HIGH (ref 43–77)
NRBC # FLD: 0 — SIGNIFICANT CHANGE UP
PCO2 BLDA: 49 MMHG — HIGH (ref 35–48)
PCO2 BLDA: 80 MMHG — CRITICAL HIGH (ref 35–48)
PH BLDA: 7.13 PH — CRITICAL LOW (ref 7.35–7.45)
PH BLDA: 7.29 PH — LOW (ref 7.35–7.45)
PHOSPHATE SERPL-MCNC: 2.1 MG/DL — LOW (ref 2.5–4.5)
PLATELET # BLD AUTO: 196 K/UL — SIGNIFICANT CHANGE UP (ref 150–400)
PMV BLD: 10.8 FL — SIGNIFICANT CHANGE UP (ref 7–13)
PO2 BLDA: 101 MMHG — SIGNIFICANT CHANGE UP (ref 83–108)
PO2 BLDA: 63 MMHG — LOW (ref 83–108)
POTASSIUM BLDA-SCNC: 4 MMOL/L — SIGNIFICANT CHANGE UP (ref 3.4–4.5)
POTASSIUM BLDA-SCNC: 4.3 MMOL/L — SIGNIFICANT CHANGE UP (ref 3.4–4.5)
POTASSIUM SERPL-MCNC: 4 MMOL/L — SIGNIFICANT CHANGE UP (ref 3.5–5.3)
POTASSIUM SERPL-SCNC: 4 MMOL/L — SIGNIFICANT CHANGE UP (ref 3.5–5.3)
PROTHROM AB SERPL-ACNC: 13.8 SEC — HIGH (ref 9.8–13.1)
RBC # BLD: 4.16 M/UL — LOW (ref 4.2–5.8)
RBC # FLD: 13.2 % — SIGNIFICANT CHANGE UP (ref 10.3–14.5)
RH IG SCN BLD-IMP: POSITIVE — SIGNIFICANT CHANGE UP
SAO2 % BLDA: 88.9 % — LOW (ref 95–99)
SAO2 % BLDA: 94.4 % — LOW (ref 95–99)
SODIUM BLDA-SCNC: 139 MMOL/L — SIGNIFICANT CHANGE UP (ref 136–146)
SODIUM BLDA-SCNC: 139 MMOL/L — SIGNIFICANT CHANGE UP (ref 136–146)
SODIUM SERPL-SCNC: 141 MMOL/L — SIGNIFICANT CHANGE UP (ref 135–145)
WBC # BLD: 18.33 K/UL — HIGH (ref 3.8–10.5)
WBC # FLD AUTO: 18.33 K/UL — HIGH (ref 3.8–10.5)

## 2018-08-24 PROCEDURE — 93010 ELECTROCARDIOGRAM REPORT: CPT | Mod: 59

## 2018-08-24 PROCEDURE — 93662 INTRACARDIAC ECG (ICE): CPT | Mod: 26

## 2018-08-24 PROCEDURE — 93656 COMPRE EP EVAL ABLTJ ATR FIB: CPT

## 2018-08-24 PROCEDURE — 93657 TX L/R ATRIAL FIB ADDL: CPT

## 2018-08-24 PROCEDURE — 93613 INTRACARDIAC EPHYS 3D MAPG: CPT

## 2018-08-24 RX ORDER — SUCRALFATE 1 G
1 TABLET ORAL
Qty: 0 | Refills: 0 | Status: DISCONTINUED | OUTPATIENT
Start: 2018-08-24 | End: 2018-08-25

## 2018-08-24 RX ORDER — MAGNESIUM SULFATE 500 MG/ML
2 VIAL (ML) INJECTION ONCE
Qty: 0 | Refills: 0 | Status: COMPLETED | OUTPATIENT
Start: 2018-08-24 | End: 2018-08-24

## 2018-08-24 RX ORDER — CLOPIDOGREL BISULFATE 75 MG/1
75 TABLET, FILM COATED ORAL DAILY
Qty: 0 | Refills: 0 | Status: DISCONTINUED | OUTPATIENT
Start: 2018-08-24 | End: 2018-08-25

## 2018-08-24 RX ORDER — LEVOTHYROXINE SODIUM 125 MCG
2 TABLET ORAL
Qty: 0 | Refills: 0 | COMMUNITY

## 2018-08-24 RX ORDER — ALBUTEROL 90 UG/1
2 AEROSOL, METERED ORAL ONCE
Qty: 0 | Refills: 0 | Status: COMPLETED | OUTPATIENT
Start: 2018-08-24 | End: 2018-08-24

## 2018-08-24 RX ORDER — LEVOTHYROXINE SODIUM 125 MCG
150 TABLET ORAL DAILY
Qty: 0 | Refills: 0 | Status: DISCONTINUED | OUTPATIENT
Start: 2018-08-24 | End: 2018-08-25

## 2018-08-24 RX ORDER — LEVOTHYROXINE SODIUM 125 MCG
1 TABLET ORAL
Qty: 0 | Refills: 0 | COMMUNITY

## 2018-08-24 RX ORDER — AMIODARONE HYDROCHLORIDE 400 MG/1
200 TABLET ORAL DAILY
Qty: 0 | Refills: 0 | Status: DISCONTINUED | OUTPATIENT
Start: 2018-08-24 | End: 2018-08-25

## 2018-08-24 RX ORDER — SODIUM CHLORIDE 9 MG/ML
3 INJECTION INTRAMUSCULAR; INTRAVENOUS; SUBCUTANEOUS EVERY 8 HOURS
Qty: 0 | Refills: 0 | Status: DISCONTINUED | OUTPATIENT
Start: 2018-08-24 | End: 2018-08-25

## 2018-08-24 RX ORDER — ASPIRIN/CALCIUM CARB/MAGNESIUM 324 MG
81 TABLET ORAL DAILY
Qty: 0 | Refills: 0 | Status: DISCONTINUED | OUTPATIENT
Start: 2018-08-24 | End: 2018-08-25

## 2018-08-24 RX ORDER — BENZOCAINE AND MENTHOL 5; 1 G/100ML; G/100ML
1 LIQUID ORAL ONCE
Qty: 0 | Refills: 0 | Status: COMPLETED | OUTPATIENT
Start: 2018-08-24 | End: 2018-08-24

## 2018-08-24 RX ORDER — PANTOPRAZOLE SODIUM 20 MG/1
40 TABLET, DELAYED RELEASE ORAL
Qty: 0 | Refills: 0 | Status: DISCONTINUED | OUTPATIENT
Start: 2018-08-24 | End: 2018-08-25

## 2018-08-24 RX ORDER — IPRATROPIUM/ALBUTEROL SULFATE 18-103MCG
3 AEROSOL WITH ADAPTER (GRAM) INHALATION EVERY 6 HOURS
Qty: 0 | Refills: 0 | Status: DISCONTINUED | OUTPATIENT
Start: 2018-08-24 | End: 2018-08-25

## 2018-08-24 RX ORDER — APIXABAN 2.5 MG/1
5 TABLET, FILM COATED ORAL EVERY 12 HOURS
Qty: 0 | Refills: 0 | Status: DISCONTINUED | OUTPATIENT
Start: 2018-08-24 | End: 2018-08-25

## 2018-08-24 RX ORDER — ALBUTEROL 90 UG/1
1 AEROSOL, METERED ORAL
Qty: 0 | Refills: 0 | COMMUNITY

## 2018-08-24 RX ADMIN — Medication 1 GRAM(S): at 17:23

## 2018-08-24 RX ADMIN — Medication 3 MILLILITER(S): at 22:31

## 2018-08-24 RX ADMIN — Medication 50 GRAM(S): at 21:44

## 2018-08-24 RX ADMIN — SODIUM CHLORIDE 3 MILLILITER(S): 9 INJECTION INTRAMUSCULAR; INTRAVENOUS; SUBCUTANEOUS at 21:38

## 2018-08-24 RX ADMIN — BENZOCAINE AND MENTHOL 1 LOZENGE: 5; 1 LIQUID ORAL at 17:25

## 2018-08-24 RX ADMIN — APIXABAN 5 MILLIGRAM(S): 2.5 TABLET, FILM COATED ORAL at 21:37

## 2018-08-24 RX ADMIN — SODIUM CHLORIDE 3 MILLILITER(S): 9 INJECTION INTRAMUSCULAR; INTRAVENOUS; SUBCUTANEOUS at 14:46

## 2018-08-24 RX ADMIN — ALBUTEROL 2 PUFF(S): 90 AEROSOL, METERED ORAL at 07:45

## 2018-08-24 RX ADMIN — Medication 150 MICROGRAM(S): at 17:26

## 2018-08-24 RX ADMIN — Medication 3 MILLILITER(S): at 14:02

## 2018-08-24 RX ADMIN — PANTOPRAZOLE SODIUM 40 MILLIGRAM(S): 20 TABLET, DELAYED RELEASE ORAL at 17:23

## 2018-08-24 RX ADMIN — AMIODARONE HYDROCHLORIDE 200 MILLIGRAM(S): 400 TABLET ORAL at 17:25

## 2018-08-24 RX ADMIN — CLOPIDOGREL BISULFATE 75 MILLIGRAM(S): 75 TABLET, FILM COATED ORAL at 17:23

## 2018-08-24 NOTE — CHART NOTE - NSCHARTNOTEFT_GEN_A_CORE
s/p AFib ablation today.  Post ablation teaching provided.  Follow up with EP Dr. Fields on Sep 6 at 11:30am.  Oncology Building 4 th Floor at [facility]  [phone].

## 2018-08-24 NOTE — DISCHARGE NOTE ADULT - CARE PLAN
Principal Discharge DX:	Atrial fibrillation  Goal:	s/p ablation  Assessment and plan of treatment:	You underwent procedure called ablation for atrial fibrillation during which area in heart around pulmonary veins entry was ablated. Procedure was without any immediate complications. You are needed to be on all of your home medications including amiodarone and Eliquis. Please follow up with your cardiologist for management of these medications in future. Please make an appointment within a week after discharge.  Secondary Diagnosis:	Hypothyroid  Goal:	stable  Assessment and plan of treatment:	You have been diagnosed with Hashimoto's thyroiditis in past for which you were taking amiodarone 150 mcg at home, please continue taking same after discharge. Your PCP/ cardiologist will change the dose as needed during your follow ups.  Secondary Diagnosis:	ASHLI (obstructive sleep apnea)  Goal:	stable  Assessment and plan of treatment:	You underwent ablation procedure under general anesthesia. Due to condition called obstructive sleep apnea your emergence from anesthesia was uncomfortable due to confusion. You were stable once you were awake. If you have noticed any day time sleepiness, tiredness, headaches, feeling of not feeling fresh after getting up in morning, you need to see Sleep physician for management of this condition.  Secondary Diagnosis:	Medication therapy continued  Goal:	continue all home medications  Assessment and plan of treatment:	Please continue your home medications as per your prescription after discharge. Your physician will help you with further management of dosages and change in medications, so follow up with him/her regularly.

## 2018-08-24 NOTE — H&P CARDIOLOGY - HISTORY OF PRESENT ILLNESS
58 year old male with hashimoto's thyroiditis, COPD, ASHLI not on CPAP, GERD, known CAD with mLAD stent 7/2018, persistent atrial fibrillation with RVR (on Eliquis - last dose PM 8/23) with DVVC x2 with Amiodarone load with recurrent atrial fibrillation with symptoms of palpitations, SOB, fatigue that limits his ADL's who now presents for atrial fibrillation ablation.     please see hard copy H&P in paper chart 8/16/18  PATIENT SEEN AND EXAMINED AND NO NEW CLINICAL CHANGES SINCE office visit

## 2018-08-24 NOTE — DISCHARGE NOTE ADULT - MEDICATION SUMMARY - MEDICATIONS TO STOP TAKING
I will STOP taking the medications listed below when I get home from the hospital:  None I will STOP taking the medications listed below when I get home from the hospital:    omeprazole 20 mg oral delayed release capsule  -- 1 cap(s) by mouth once a day

## 2018-08-24 NOTE — DISCHARGE NOTE ADULT - NS AS ACTIVITY OBS
Showering allowed/Walking-Indoors allowed/Sex allowed/Walking-Outdoors allowed/Stairs allowed/Return to Work/School allowed/Driving allowed Showering allowed/Walking-Indoors allowed/No Heavy lifting/straining/Sex allowed/Walking-Outdoors allowed/Stairs allowed/Return to Work/School allowed/Driving allowed

## 2018-08-24 NOTE — DISCHARGE NOTE ADULT - MEDICATION SUMMARY - MEDICATIONS TO TAKE
I will START or STAY ON the medications listed below when I get home from the hospital:    aspirin 81 mg oral delayed release tablet  -- 1 tab(s) by mouth once a day  -- Indication: For Atrial fibrillation    amiodarone 200 mg oral tablet  -- 1 tab(s) by mouth once a day   -- Avoid grapefruit and grapefruit juice while taking this medication.  Avoid prolonged or excessive exposure to direct and/or artificial sunlight while taking this medication.  Do not take this drug if you are pregnant.  It is very important that you take or use this exactly as directed.  Do not skip doses or discontinue unless directed by your doctor.  May cause drowsiness or dizziness.    -- Indication: For Atrial fibrillation    apixaban 5 mg oral tablet  -- 1 tab(s) by mouth every 12 hours  -- Indication: For Atrial fibrillation    clopidogrel 75 mg oral tablet  -- 1 tab(s) by mouth once a day  -- Indication: For Atrial fibrillation    Ventolin 90 mcg/inh inhalation aerosol with adapter  -- 1 puff(s) inhaled every 6 hours, As Needed  -- Indication: For ASHLI    omeprazole 20 mg oral delayed release capsule  -- 1 cap(s) by mouth once a day  -- Indication: For Prophylactic measure    levothyroxine 150 mcg (0.15 mg) oral capsule  -- 1 cap(s) by mouth once a day  -- Indication: For hypothyroidism I will START or STAY ON the medications listed below when I get home from the hospital:    aspirin 81 mg oral delayed release tablet  -- 1 tab(s) by mouth once a day  -- Indication: For Atrial fibrillation    amiodarone 200 mg oral tablet  -- 1 tab(s) by mouth once a day   -- Avoid grapefruit and grapefruit juice while taking this medication.  Avoid prolonged or excessive exposure to direct and/or artificial sunlight while taking this medication.  Do not take this drug if you are pregnant.  It is very important that you take or use this exactly as directed.  Do not skip doses or discontinue unless directed by your doctor.  May cause drowsiness or dizziness.    -- Indication: For Atrial fibrillation    apixaban 5 mg oral tablet  -- 1 tab(s) by mouth every 12 hours  -- Indication: For Atrial fibrillation    clopidogrel 75 mg oral tablet  -- 1 tab(s) by mouth once a day  -- Indication: For Atrial fibrillation    Ventolin 90 mcg/inh inhalation aerosol with adapter  -- 1 puff(s) inhaled every 6 hours, As Needed  -- Indication: For ASHLI    Protonix 40 mg oral delayed release tablet  -- 1 tab(s) by mouth once a day (before a meal)  -- Indication: For s/p ablation    levothyroxine 150 mcg (0.15 mg) oral capsule  -- 1 cap(s) by mouth once a day  -- Indication: For hypothyroidism

## 2018-08-24 NOTE — H&P CARDIOLOGY - PMH
Atrial fibrillation  DCCV x2  COPD (chronic obstructive pulmonary disease)    GERD (gastroesophageal reflux disease)    Hypothyroid    ASHLI (obstructive sleep apnea)  dx'd 30 years ago, denies being on CPAP

## 2018-08-24 NOTE — DISCHARGE NOTE ADULT - PATIENT PORTAL LINK FT
You can access the [facility] Patient Portal, offered by [facility], by registering with the following website: [email or web address]

## 2018-08-24 NOTE — DISCHARGE NOTE ADULT - ADDITIONAL INSTRUCTIONS
Please follow up with Dr. Fields on September 6th at 11:30am. 4th Floor Oncology Building at [facility]. Phone number is [phone].

## 2018-08-24 NOTE — DISCHARGE NOTE ADULT - INSTRUCTIONS
Follow DASH diet which means  -Eat more vegetables and fruits.  -Swap refined grains for whole grains.  -Choose fat-free or low-fat dairy products.  -Choose lean protein sources like fish, poultry and beans.

## 2018-08-24 NOTE — DISCHARGE NOTE ADULT - HOSPITAL COURSE
58 year old male with hashimoto's thyroiditis, COPD, ASHLI not on CPAP, GERD, known CAD with mLAD stent 7/2018, persistent atrial fibrillation with RVR (on Eliquis - last dose PM 8/23) s/p DCCV x 2 with Amiodarone load with recurrent symptomatic atrial fibrillation came in for atrial fibrillation ablation on 8/24. Patient underwent procedure under general anesthesia and intubation without any immediate surgical complications. Pt had turbulent emergence from GA in setting of his ASHLI history, once awake he was stable, oriented and able to follow all commands.    Pt was resumed on his home meds on day 2 except for Eliquis which was resumed on day 1 in evening. Melton was removed post procedure once pt was awake as he had severe discomfort from Melton. Left radial arterial line was d/c ed on day 2 AM. Right femoral puncture site remained stable. Rest of the course remained uneventful and pt was discharged home with follow up instructions from EP. 58 year old male with hashimoto's thyroiditis, COPD, ASHLI not on CPAP, GERD, known CAD with mLAD stent 7/2018, persistent atrial fibrillation with RVR (on Eliquis - last dose PM 8/23) s/p DCCV x 2 with Amiodarone load with recurrent symptomatic atrial fibrillation came in for atrial fibrillation ablation on 8/24. Patient underwent procedure under general anesthesia and intubation without any immediate surgical complications. Pt had turbulent emergence from GA in setting of his ASHLI history, once awake he was stable, oriented and able to follow all commands.    Pt was resumed on his home meds on day 2 except for Eliquis which was resumed on day 1 in evening. Melton was removed post procedure once pt was awake as he had severe discomfort from Melton. Left radial arterial line was d/c ed on day 2 AM. Right femoral puncture site remained stable. Rest of the course remained uneventful and pt was discharged home with follow up instructions from EP. Patient discharged home

## 2018-08-24 NOTE — H&P CARDIOLOGY - SOURCE
- fair historian but when advising him of day plans ahead/prep he resistantly complies to most directions  - and medical records/Patient

## 2018-08-24 NOTE — DISCHARGE NOTE ADULT - PLAN OF CARE
s/p ablation You underwent procedure called ablation for atrial fibrillation during which area in heart around pulmonary veins entry was ablated. Procedure was without any immediate complications. You are needed to be on all of your home medications including amiodarone and Eliquis. Please follow up with your cardiologist for management of these medications in future. Please make an appointment within a week after discharge. stable You have been diagnosed with Hashimoto's thyroiditis in past for which you were taking amiodarone 150 mcg at home, please continue taking same after discharge. Your PCP/ cardiologist will change the dose as needed during your follow ups. You underwent ablation procedure under general anesthesia. Due to condition called obstructive sleep apnea your emergence from anesthesia was uncomfortable due to confusion. You were stable once you were awake. If you have noticed any day time sleepiness, tiredness, headaches, feeling of not feeling fresh after getting up in morning, you need to see Sleep physician for management of this condition. continue all home medications Please continue your home medications as per your prescription after discharge. Your physician will help you with further management of dosages and change in medications, so follow up with him/her regularly.

## 2018-08-24 NOTE — PATIENT PROFILE ADULT. - PRO ANTICIPATED DISCH DISP
How Severe Is Your Skin Lesion?: mild
Have Your Skin Lesions Been Treated?: not been treated
Is This A New Presentation, Or A Follow-Up?: Skin Lesions
home

## 2018-08-24 NOTE — CHART NOTE - NSCHARTNOTEFT_GEN_A_CORE
58 year old male with hashimoto's thyroiditis, COPD, ASHLI not on CPAP at home, GERD, known CAD with mLAD stent 7/2018, persistent atrial fibrillation with RVR (on Eliquis - last dose PM 8/23) with DVVC x2 with Amiodarone load with recurrent atrial fibrillation with symptoms of palpitations, SOB, fatigue that limits his ADL's who now presents for atrial fibrillation ablation.   pt received from cath lab s/p ablation for AF under GA, no immediate surgical complications. Pt confused and snoring o0n arrival, pooling of secretions in back of throat, suctioned. Pt not tolerating facial mask, maintained on NC, labored with breathing, RR in 30s.  ABG with acute respi acidosis with CO2 80. Pt became more awake and alert after a while, breathing improved, maintaining saturation on room air. Left radial arterial line in situ. Right femoral puncture site stable. Pt is having severe discomfort from Melton, plan to d/c    To Do:  -start soft  diet  -start eliquis 5 mg at 10 pm  -d/c arterial line in AM  -all other home medications to be resumed tomorrow  -watch for snoring in night, no opioids for pain management  -Cepacol lozenges for sore throat from intubation  -pantoprazole 40 mg OD, discharge with omeprazole home meds  -carafate 1 gm bid      Dr Miranda Schafer/ R1

## 2018-08-24 NOTE — DISCHARGE NOTE ADULT - CARE PROVIDER_API CALL
Kevan Fields), Cardiac Electrophysiology; Cardiology; Internal Medicine  [address] [place] [address]  Phone: [phone]  Fax: [phone]

## 2018-08-24 NOTE — CHART NOTE - NSCHARTNOTEFT_GEN_A_CORE
Type of procedure: PVI  Licensed independent practitioner: Kevan Fields MD  Assistant: None  Description of procedure: After informed consent was obtained, the patient was brought to the Electrophysiology laboratory in the fasting state, and was prepped and draped in the usual sterile fashion. The patient was electively intubated by members of the anesthesia department, who provided sedation throughout the case. In addition an esophageal temperature probe was placed in the esophagus to allow dynamic temperate monitoring during ablation. The patient was under uninterrupted apixaban therapy.  Sheaths were placed as described in the procedure report, and catheters were advanced into the heart under fluoroscopic guidance without complications. Baseline intra-cardiac echocardiography (ICE) demonstrated the following: The LV size and functions were abnormal. There was no pericardial effusion at baseline  IV Heparin bolus was given followed by continuous drip to maintain the -400s throughout the case.  The left atrium was entered under fluoroscopic and ICE guidance by a single transseptal approach using a medium curve Agilis sheath.  Entry of the LA was verified by pressure measurements (LA pressure 21 mmHg). There were no complications. The patient was externally cardioverted to sinus rhythm with 200J.  A PentaRay Rj F curve catheter and a 3.5 mm irrigated-tip Nav[employer] ThermoCool D/F-curve ablation catheter were used for mapping and ablation. A 3D anatomy of the LA was performed using Carto 3 V4.  We first isolated the left pulmonary veins in an antral approach with demonstration of entrance block. No zelalem lesions were required for isolation of the left PVs. We then similarly isolated the right pulmonary veins in an antral approach.  No carinal lesions wer required for isolation. Bilateral PVs showed evidence of entrance and exit block after pacing maneuvers. With pacing inside the PV, there was local PV capture without capture of the LA in the LSPV, LIPV, RSPV, and RSPV. There were also isolated potentials. Esophageal temperatures brigid from a baseline of 35.8 degrees Celsius to a peak of 36.2 degrees Celsius.  The pulmonary veins remained isolated for >30 minutes, without evidence of acute reconnection. Burst pacing was performed from the proximal CS bipole dopwn to 220ms without induction of any atrial arrhythmias.  As such, heparin was stopped and the sheaths were pulled back to the right atrium. Repeat ICE imaging revealed no pericardial effusion.  All catheters were removed from the body and sheaths were left in place for removal once ACT declines to below 200 seconds. The final left atrial pressure was 21 mmHg.  A total of 40mg of protamine was given to reverse the Heparin effect.    The patient tolerated the procedure well and was successfully extubated and transferred to the PACU for further monitoring. There were no complications.    Findings of procedure: Successful isolation of the pulmonary veins in an antral circumferential approach. No acute reconnections.  Estimated blood loss: <5cc  Specimen removed: N/A  Preoperative Dx: Afib  Postoperative Dx: Sinus  Complications: None  Anesthesia type: General

## 2018-08-25 VITALS — RESPIRATION RATE: 13 BRPM | HEART RATE: 92 BPM

## 2018-08-25 DIAGNOSIS — E03.9 HYPOTHYROIDISM, UNSPECIFIED: ICD-10-CM

## 2018-08-25 DIAGNOSIS — K21.9 GASTRO-ESOPHAGEAL REFLUX DISEASE WITHOUT ESOPHAGITIS: ICD-10-CM

## 2018-08-25 DIAGNOSIS — G47.33 OBSTRUCTIVE SLEEP APNEA (ADULT) (PEDIATRIC): ICD-10-CM

## 2018-08-25 DIAGNOSIS — I48.91 UNSPECIFIED ATRIAL FIBRILLATION: ICD-10-CM

## 2018-08-25 DIAGNOSIS — J44.9 CHRONIC OBSTRUCTIVE PULMONARY DISEASE, UNSPECIFIED: ICD-10-CM

## 2018-08-25 LAB
BUN SERPL-MCNC: 20 MG/DL — SIGNIFICANT CHANGE UP (ref 7–23)
CALCIUM SERPL-MCNC: 8.2 MG/DL — LOW (ref 8.4–10.5)
CHLORIDE SERPL-SCNC: 103 MMOL/L — SIGNIFICANT CHANGE UP (ref 98–107)
CO2 SERPL-SCNC: 24 MMOL/L — SIGNIFICANT CHANGE UP (ref 22–31)
CREAT SERPL-MCNC: 1.48 MG/DL — HIGH (ref 0.5–1.3)
GLUCOSE SERPL-MCNC: 101 MG/DL — HIGH (ref 70–99)
HCT VFR BLD CALC: 37.9 % — LOW (ref 39–50)
HGB BLD-MCNC: 12.4 G/DL — LOW (ref 13–17)
MAGNESIUM SERPL-MCNC: 2.3 MG/DL — SIGNIFICANT CHANGE UP (ref 1.6–2.6)
MCHC RBC-ENTMCNC: 30.7 PG — SIGNIFICANT CHANGE UP (ref 27–34)
MCHC RBC-ENTMCNC: 32.7 % — SIGNIFICANT CHANGE UP (ref 32–36)
MCV RBC AUTO: 93.8 FL — SIGNIFICANT CHANGE UP (ref 80–100)
NRBC # FLD: 0 — SIGNIFICANT CHANGE UP
PHOSPHATE SERPL-MCNC: 3.1 MG/DL — SIGNIFICANT CHANGE UP (ref 2.5–4.5)
PLATELET # BLD AUTO: 181 K/UL — SIGNIFICANT CHANGE UP (ref 150–400)
PMV BLD: 10.6 FL — SIGNIFICANT CHANGE UP (ref 7–13)
POTASSIUM SERPL-MCNC: 3.7 MMOL/L — SIGNIFICANT CHANGE UP (ref 3.5–5.3)
POTASSIUM SERPL-SCNC: 3.7 MMOL/L — SIGNIFICANT CHANGE UP (ref 3.5–5.3)
RBC # BLD: 4.04 M/UL — LOW (ref 4.2–5.8)
RBC # FLD: 13.2 % — SIGNIFICANT CHANGE UP (ref 10.3–14.5)
SODIUM SERPL-SCNC: 139 MMOL/L — SIGNIFICANT CHANGE UP (ref 135–145)
WBC # BLD: 12.88 K/UL — HIGH (ref 3.8–10.5)
WBC # FLD AUTO: 12.88 K/UL — HIGH (ref 3.8–10.5)

## 2018-08-25 PROCEDURE — 99239 HOSP IP/OBS DSCHRG MGMT >30: CPT

## 2018-08-25 RX ORDER — PANTOPRAZOLE SODIUM 20 MG/1
1 TABLET, DELAYED RELEASE ORAL
Qty: 30 | Refills: 0 | OUTPATIENT
Start: 2018-08-25 | End: 2018-09-23

## 2018-08-25 RX ORDER — OMEPRAZOLE 10 MG/1
1 CAPSULE, DELAYED RELEASE ORAL
Qty: 0 | Refills: 0 | COMMUNITY

## 2018-08-25 RX ADMIN — Medication 3 MILLILITER(S): at 03:20

## 2018-08-25 RX ADMIN — AMIODARONE HYDROCHLORIDE 200 MILLIGRAM(S): 400 TABLET ORAL at 06:27

## 2018-08-25 RX ADMIN — SODIUM CHLORIDE 3 MILLILITER(S): 9 INJECTION INTRAMUSCULAR; INTRAVENOUS; SUBCUTANEOUS at 06:27

## 2018-08-25 RX ADMIN — PANTOPRAZOLE SODIUM 40 MILLIGRAM(S): 20 TABLET, DELAYED RELEASE ORAL at 06:27

## 2018-08-25 RX ADMIN — APIXABAN 5 MILLIGRAM(S): 2.5 TABLET, FILM COATED ORAL at 11:12

## 2018-08-25 RX ADMIN — Medication 150 MICROGRAM(S): at 06:27

## 2018-08-25 RX ADMIN — Medication 1 GRAM(S): at 06:27

## 2018-08-25 RX ADMIN — CLOPIDOGREL BISULFATE 75 MILLIGRAM(S): 75 TABLET, FILM COATED ORAL at 11:12

## 2018-08-25 RX ADMIN — Medication 81 MILLIGRAM(S): at 11:13

## 2018-08-25 NOTE — PROGRESS NOTE ADULT - SUBJECTIVE AND OBJECTIVE BOX
Date of Admission:  8//18  24 hour events:  No overnight events  Vital Signs Last 24 Hrs  T(C): 37.2 (25 Aug 2018 04:00), Max: 37.6 (24 Aug 2018 20:00)  T(F): 99 (25 Aug 2018 04:00), Max: 99.7 (24 Aug 2018 20:00)  HR: 74 (25 Aug 2018 06:00) (74 - 96)  BP: 134/80 (24 Aug 2018 13:45) (130/78 - 134/80)  BP(mean): --  RR: 18 (25 Aug 2018 06:00) (14 - 32)  SpO2: 93% (25 Aug 2018 06:00) (91% - 99%)  I&O's Summary    24 Aug 2018 07:01  -  25 Aug 2018 07:00  --------------------------------------------------------  IN: 930 mL / OUT: 2060 mL / NET: -1130 mL        MEDICATIONS:  amiodarone    Tablet 200 milliGRAM(s) Oral daily  apixaban 5 milliGRAM(s) Oral every 12 hours  aspirin enteric coated 81 milliGRAM(s) Oral daily  clopidogrel Tablet 75 milliGRAM(s) Oral daily  ALBUTerol/ipratropium for Nebulization 3 milliLiter(s) Nebulizer every 6 hours  pantoprazole    Tablet 40 milliGRAM(s) Oral before breakfast  sucralfate 1 Gram(s) Oral two times a day  levothyroxine 150 MICROGram(s) Oral daily  sodium chloride 0.9% lock flush 3 milliLiter(s) IV Push every 8 hours      REVIEW OF SYSTEMS:  Complete 10point ROS negative.    PHYSICAL EXAM:  General: NAD  Cardiovascular: Normal S1 S2, No JVD, No murmurs, No edema  Respiratory: Lungs clear to auscultation	  Gastrointestinal:  Soft, Non-tender, + BS	  Skin: warm and dry, No rashes, No ecchymoses, No cyanosis	  Extremities: Normal range of motion, No clubbing, cyanosis or edema  Vascular: Peripheral pulses palpable 2+ bilaterally    LABS:	 	    CBC Full  -  ( 25 Aug 2018 06:15 )  WBC Count : 12.88 K/uL  Hemoglobin : 12.4 g/dL  Hematocrit : 37.9 %  Platelet Count - Automated : 181 K/uL  Mean Cell Volume : 93.8 fL  Mean Cell Hemoglobin : 30.7 pg  Mean Cell Hemoglobin Concentration : 32.7 %  Auto Neutrophil # : x  Auto Lymphocyte # : x  Auto Monocyte # : x  Auto Eosinophil # : x  Auto Basophil # : x  Auto Neutrophil % : x  Auto Lymphocyte % : x  Auto Monocyte % : x  Auto Eosinophil % : x  Auto Basophil % : x    08-25    139  |  103  |  20  ----------------------------<  101<H>  3.7   |  24  |  1.48<H>  08-24    141  |  104  |  21  ----------------------------<  127<H>  4.0   |  24  |  1.58<H>    Ca    8.2<L>      25 Aug 2018 06:15  Ca    8.6      24 Aug 2018 20:00  Phos  3.1     08-25  Phos  2.1     08-24  Mg     2.3     08-25  Mg     1.7     08-24    < from: Transesophageal Echocardiogram (07.19.18 @ 09:08) >  Ejection Fraction (Visual Estimate): 40-45 %  ------------------------------------------------------------------------  OBSERVATIONS:  Mitral Valve: Mitral annular calcification, otherwise  normal mitral valve. Moderate mitral regurgitation at SBP  120 mm Hg.  Aortic Root: Normal aortic root.  Aortic Valve: Calcified trileaflet aortic valve with normal  opening. Minimal aortic regurgitation.  Left Atrium: Normal left atrium.  LA volume index = 29  cc/m2. No left atrial or left atrial appendage thrombus.  Left Ventricle: Moderate global left ventricular systolic  dysfunction. Normal left ventricular internal dimensions  and wall thicknesses.  Right Heart: Normal right atrium. Normal right ventricular  size and function. Normal tricuspid valve. Minimal  tricuspid regurgitation. Normal pulmonic valve. Minimal  pulmonic regurgitation.  Pericardium/PleuraNormal pericardium with no pericardial  effusion.  Hemodynamic: Agitated saline injection and color flow  Doppler demonstrate evidence of a patent foramen ovale.  ------------------------------------------------------------------------  CONCLUSIONS:  1. Moderate mitral regurgitation at  mm Hg.  2. Normal left atrium.  LA volume index= 29 cc/m2. No left  atrial or left atrial appendage thrombus.  3. Moderate global left ventricular systolic dysfunction.  4. Normal right ventricular size and function.  5. Agitated saline injection and color flow Doppler  demonstrate evidence of a patent foramen ovale.  ------------------------------------------------------------------------  Confirmed on  7/19/2018 - 11:47:20 by Gildardo Enamorado M.D.  ------------------------------------------------------------------------    < end of copied text >

## 2018-08-25 NOTE — PROGRESS NOTE ADULT - ASSESSMENT
Patient is a 58 year old male with PMH PAF, ASHLI. COPD, hypothyroid, GERD presented to [facility] for afib ablation. He is now POD #1 s/p AFib ablation. Post ablation teaching provided.  Follow up with EP Dr. Fields on Sep 6 at 11:30am.  Oncology Building 4 th Floor at [facility]  [phone].

## 2018-08-25 NOTE — PROGRESS NOTE ADULT - PROBLEM SELECTOR PLAN 1
s/p ablation. Patient has an appt with   on September s/p ablation. Patient has an appt with Dr. Fields  on September 6th at 11:30. Currently in normal sinus rhythm.  -continue eliquis 5mg po bid and protonix 40mg po qd

## 2018-09-06 ENCOUNTER — APPOINTMENT (OUTPATIENT)
Dept: ELECTROPHYSIOLOGY | Facility: CLINIC | Age: 58
End: 2018-09-06
Payer: COMMERCIAL

## 2018-09-06 ENCOUNTER — NON-APPOINTMENT (OUTPATIENT)
Age: 58
End: 2018-09-06

## 2018-09-06 VITALS
BODY MASS INDEX: 31.91 KG/M2 | HEIGHT: 68.5 IN | HEART RATE: 87 BPM | OXYGEN SATURATION: 96 % | WEIGHT: 213 LBS | DIASTOLIC BLOOD PRESSURE: 90 MMHG | SYSTOLIC BLOOD PRESSURE: 134 MMHG

## 2018-09-06 DIAGNOSIS — Z79.899 OTHER LONG TERM (CURRENT) DRUG THERAPY: ICD-10-CM

## 2018-09-06 PROCEDURE — 99214 OFFICE O/P EST MOD 30 MIN: CPT

## 2018-09-06 PROCEDURE — 93000 ELECTROCARDIOGRAM COMPLETE: CPT

## 2018-09-28 ENCOUNTER — MESSAGE (OUTPATIENT)
Age: 58
End: 2018-09-28

## 2018-10-30 ENCOUNTER — OTHER (OUTPATIENT)
Age: 58
End: 2018-10-30

## 2018-10-30 RX ORDER — AMIODARONE HYDROCHLORIDE 200 MG/1
200 TABLET ORAL DAILY
Qty: 30 | Refills: 1 | Status: DISCONTINUED | COMMUNITY
Start: 2018-08-16 | End: 2018-10-30

## 2018-10-30 RX ORDER — APIXABAN 5 MG/1
5 TABLET, FILM COATED ORAL
Qty: 60 | Refills: 5 | Status: DISCONTINUED | COMMUNITY
Start: 2018-08-06 | End: 2018-10-30

## 2018-12-10 ENCOUNTER — NON-APPOINTMENT (OUTPATIENT)
Age: 58
End: 2018-12-10

## 2018-12-10 ENCOUNTER — APPOINTMENT (OUTPATIENT)
Dept: ELECTROPHYSIOLOGY | Facility: CLINIC | Age: 58
End: 2018-12-10
Payer: COMMERCIAL

## 2018-12-10 VITALS
HEART RATE: 101 BPM | HEIGHT: 68.5 IN | SYSTOLIC BLOOD PRESSURE: 155 MMHG | OXYGEN SATURATION: 96 % | WEIGHT: 212 LBS | BODY MASS INDEX: 31.76 KG/M2 | DIASTOLIC BLOOD PRESSURE: 94 MMHG

## 2018-12-10 VITALS — OXYGEN SATURATION: 96 % | SYSTOLIC BLOOD PRESSURE: 136 MMHG | DIASTOLIC BLOOD PRESSURE: 91 MMHG | HEART RATE: 99 BPM

## 2018-12-10 DIAGNOSIS — M19.90 UNSPECIFIED OSTEOARTHRITIS, UNSPECIFIED SITE: ICD-10-CM

## 2018-12-10 PROCEDURE — 99214 OFFICE O/P EST MOD 30 MIN: CPT

## 2018-12-10 PROCEDURE — 93000 ELECTROCARDIOGRAM COMPLETE: CPT

## 2018-12-10 NOTE — DISCUSSION/SUMMARY
[FreeTextEntry1] : Floyd Mcleod is a 59 y/o man with Hx of Hashimoto's thyroiditis on levothyroxine, COPD, ASHLI, not on CPAP, GERD, CAD s/p PCI to mLAD (7/18/2018), of which all are stable and new onset persistent afib with RVR s/p ASHLIE/DCCV on 7/19/2018, repeat DCCV on 8/9/2018 with Amiodarone load, and now s/p PVI ablation on 8/24/2018, who presents today for routine f/u.\par \par Impression:\par \par 1. Persistent atrial fibrillation: s/p PVI ablation. EKG today sinus tachycardia. Off beta blockers and Amiodarone at this time and self discontinued used of AC.  Recommend undergoing ILR placement for long term afib monitoring. Urged importance of monitoring given no use of AC and risk of thromboembolic event. Discussed at length and may consider in future. \par \par 2. HTN: BP elevated in office. Not currently on antihypertensives. Refuses medication at this time. Encouraged heart healthy diet, sodium restriction, and smoking cessation as well as weight loss. \par \par 3. CAD: self discontinued use of ASA and remains on clopidogrel as prescribed. Encouraged compliance with medication including use of ASA 81mg daily.  \par \par Will RTO for f/u in 3 months.

## 2018-12-10 NOTE — PHYSICAL EXAM
[General Appearance - Well Developed] : well developed [Normal Appearance] : normal appearance [Well Groomed] : well groomed [General Appearance - Well Nourished] : well nourished [No Deformities] : no deformities [General Appearance - In No Acute Distress] : no acute distress [Normal Conjunctiva] : the conjunctiva exhibited no abnormalities [Eyelids - No Xanthelasma] : the eyelids demonstrated no xanthelasmas [Normal Oral Mucosa] : normal oral mucosa [No Oral Pallor] : no oral pallor [No Oral Cyanosis] : no oral cyanosis [Normal Jugular Venous A Waves Present] : normal jugular venous A waves present [Normal Jugular Venous V Waves Present] : normal jugular venous V waves present [No Jugular Venous Cuenca A Waves] : no jugular venous cuenca A waves [Respiration, Rhythm And Depth] : normal respiratory rhythm and effort [Exaggerated Use Of Accessory Muscles For Inspiration] : no accessory muscle use [Auscultation Breath Sounds / Voice Sounds] : lungs were clear to auscultation bilaterally [Heart Rate And Rhythm] : heart rate and rhythm were normal [Heart Sounds] : normal S1 and S2 [Murmurs] : no murmurs present [Abdomen Soft] : soft [Abdomen Tenderness] : non-tender [Abdomen Mass (___ Cm)] : no abdominal mass palpated [Abnormal Walk] : normal gait [Gait - Sufficient For Exercise Testing] : the gait was sufficient for exercise testing [Nail Clubbing] : no clubbing of the fingernails [Cyanosis, Localized] : no localized cyanosis [Petechial Hemorrhages (___cm)] : no petechial hemorrhages [Skin Color & Pigmentation] : normal skin color and pigmentation [] : no rash [No Venous Stasis] : no venous stasis [Skin Lesions] : no skin lesions [No Skin Ulcers] : no skin ulcer [No Xanthoma] : no  xanthoma was observed [Oriented To Time, Place, And Person] : oriented to person, place, and time [Affect] : the affect was normal [Mood] : the mood was normal [No Anxiety] : not feeling anxious

## 2018-12-10 NOTE — HISTORY OF PRESENT ILLNESS
[FreeTextEntry1] : Jose Sagastume MD\par \par Floyd Mcleod is a 57 y/o man with Hx of Hashimoto's thyroiditis on levothyroxine, COPD, ASHLI, not on CPAP, GERD, CAD s/p PCI to mLAD (7/18/2018), of which all are stable and new onset persistent afib with RVR s/p ASHLIE/DCCV on 7/19/2018, repeat DCCV on 8/9/2018 with Amiodarone load, and now s/p PVI ablation on 8/24/2018, who presents today for routine f/u. Admits doing well  with no recurrence of afib from his knowledge. Self discontinued use of Eliquis and ASA. Verbalizes understanding of thromboembolic risk without use of AC and importance of ASA given CAD. Takes Naproxen daily for joint pain. Denies chest pain, palpitations, SOB, syncope or near syncope.

## 2019-01-17 ENCOUNTER — MESSAGE (OUTPATIENT)
Age: 59
End: 2019-01-17

## 2019-02-01 ENCOUNTER — MESSAGE (OUTPATIENT)
Age: 59
End: 2019-02-01

## 2019-02-01 RX ORDER — ASPIRIN ENTERIC COATED TABLETS 81 MG 81 MG/1
81 TABLET, DELAYED RELEASE ORAL DAILY
Refills: 0 | Status: DISCONTINUED | COMMUNITY
Start: 2018-08-06 | End: 2019-02-01

## 2019-02-01 RX ORDER — CLOPIDOGREL BISULFATE 75 MG/1
75 TABLET, FILM COATED ORAL
Qty: 90 | Refills: 0 | Status: DISCONTINUED | COMMUNITY
Start: 2018-08-06 | End: 2019-02-01

## 2019-02-26 ENCOUNTER — MESSAGE (OUTPATIENT)
Age: 59
End: 2019-02-26

## 2019-03-14 ENCOUNTER — APPOINTMENT (OUTPATIENT)
Dept: ELECTROPHYSIOLOGY | Facility: CLINIC | Age: 59
End: 2019-03-14
Payer: COMMERCIAL

## 2019-03-14 VITALS
WEIGHT: 216 LBS | HEIGHT: 68.5 IN | BODY MASS INDEX: 32.36 KG/M2 | DIASTOLIC BLOOD PRESSURE: 91 MMHG | OXYGEN SATURATION: 97 % | HEART RATE: 78 BPM | SYSTOLIC BLOOD PRESSURE: 133 MMHG

## 2019-03-14 VITALS — SYSTOLIC BLOOD PRESSURE: 141 MMHG | DIASTOLIC BLOOD PRESSURE: 86 MMHG

## 2019-03-14 PROCEDURE — 99214 OFFICE O/P EST MOD 30 MIN: CPT

## 2019-03-14 PROCEDURE — 93000 ELECTROCARDIOGRAM COMPLETE: CPT

## 2019-03-14 NOTE — DISCUSSION/SUMMARY
[FreeTextEntry1] : Floyd Mcleod is a 60 y/o man with Hx of Hashimoto's thyroiditis on levothyroxine, COPD, ASHLI, not on CPAP, GERD, CAD s/p PCI to mLAD (7/18/2018), of which all are stable and new onset persistent afib with RVR s/p ASHLIE/DCCV on 7/19/2018, repeat DCCV on 8/9/2018 with Amiodarone load, and now s/p PVI ablation on 8/24/2018, who presents today for routine f/u. \par \par Impression:\par \par 1. Persistent atrial fibrillation: s/p PVI ablation. EKG today NSR. Off beta blockers and Amiodarone at this time and self discontinued used of AC.  Recommend undergoing ILR placement for long term afib monitoring. Urged importance of monitoring given no use of AC and risk of thromboembolic event. Discussed at length and may consider in future. \par \par 2. HTN: BP elevated in office. Not currently on antihypertensives. Refuses medication at this time. Encouraged heart healthy diet, sodium restriction, and smoking cessation as well as weight loss. \par \par 3. CAD: self discontinued use of ASA and clopidogrel as prescribed. Encouraged compliance with medication given known CAD and PCI placement. Verbalizes understanding of risk without use of platelet inhibitors as prescribed.\par \par Will RTO for f/u in 6 months.

## 2019-03-14 NOTE — HISTORY OF PRESENT ILLNESS
[FreeTextEntry1] : Floyd Mcleod is a 58 y/o man with Hx of Hashimoto's thyroiditis on levothyroxine, COPD, ASHLI, not on CPAP, GERD, CAD s/p PCI to mLAD (7/18/2018), of which all are stable and new onset persistent afib with RVR s/p ASHLIE/DCCV on 7/19/2018, repeat DCCV on 8/9/2018 with Amiodarone load, and now s/p PVI ablation on 8/24/2018, who presents today for routine f/u. Admits doing well  with no recurrence of afib from his knowledge. Self discontinued use of Eliquis and clopidogrel. Verbalizes understanding of thromboembolic risk without use of AC and importance of ASA/clopidogrel given CAD. Denies chest pain, palpitations, SOB, syncope or near syncope.

## 2019-03-17 ENCOUNTER — TRANSCRIPTION ENCOUNTER (OUTPATIENT)
Age: 59
End: 2019-03-17

## 2019-03-24 ENCOUNTER — NON-APPOINTMENT (OUTPATIENT)
Age: 59
End: 2019-03-24

## 2019-09-19 ENCOUNTER — APPOINTMENT (OUTPATIENT)
Dept: ELECTROPHYSIOLOGY | Facility: CLINIC | Age: 59
End: 2019-09-19
Payer: COMMERCIAL

## 2019-09-19 ENCOUNTER — NON-APPOINTMENT (OUTPATIENT)
Age: 59
End: 2019-09-19

## 2019-09-19 VITALS
HEIGHT: 68.5 IN | WEIGHT: 215 LBS | SYSTOLIC BLOOD PRESSURE: 148 MMHG | DIASTOLIC BLOOD PRESSURE: 103 MMHG | BODY MASS INDEX: 32.21 KG/M2 | OXYGEN SATURATION: 97 % | RESPIRATION RATE: 14 BRPM | HEART RATE: 96 BPM

## 2019-09-19 DIAGNOSIS — G47.33 OBSTRUCTIVE SLEEP APNEA (ADULT) (PEDIATRIC): ICD-10-CM

## 2019-09-19 PROCEDURE — 93000 ELECTROCARDIOGRAM COMPLETE: CPT

## 2019-09-19 PROCEDURE — 99214 OFFICE O/P EST MOD 30 MIN: CPT

## 2020-01-30 ENCOUNTER — TRANSCRIPTION ENCOUNTER (OUTPATIENT)
Age: 60
End: 2020-01-30

## 2020-03-26 ENCOUNTER — APPOINTMENT (OUTPATIENT)
Dept: ELECTROPHYSIOLOGY | Facility: CLINIC | Age: 60
End: 2020-03-26

## 2020-07-23 ENCOUNTER — APPOINTMENT (OUTPATIENT)
Dept: ELECTROPHYSIOLOGY | Facility: CLINIC | Age: 60
End: 2020-07-23
Payer: MEDICAID

## 2020-07-23 VITALS
OXYGEN SATURATION: 97 % | TEMPERATURE: 97 F | HEART RATE: 74 BPM | BODY MASS INDEX: 33.25 KG/M2 | HEIGHT: 68.51 IN | SYSTOLIC BLOOD PRESSURE: 136 MMHG | DIASTOLIC BLOOD PRESSURE: 88 MMHG

## 2020-07-23 VITALS — BODY MASS INDEX: 33.26 KG/M2 | WEIGHT: 222 LBS

## 2020-07-23 DIAGNOSIS — Z86.79 OTHER SPECIFIED POSTPROCEDURAL STATES: ICD-10-CM

## 2020-07-23 DIAGNOSIS — I10 ESSENTIAL (PRIMARY) HYPERTENSION: ICD-10-CM

## 2020-07-23 DIAGNOSIS — I25.10 ATHEROSCLEROTIC HEART DISEASE OF NATIVE CORONARY ARTERY W/OUT ANGINA PECTORIS: ICD-10-CM

## 2020-07-23 DIAGNOSIS — Z98.890 OTHER SPECIFIED POSTPROCEDURAL STATES: ICD-10-CM

## 2020-07-23 DIAGNOSIS — I48.19 OTHER PERSISTENT ATRIAL FIBRILLATION: ICD-10-CM

## 2020-07-23 PROCEDURE — 93000 ELECTROCARDIOGRAM COMPLETE: CPT

## 2020-07-23 PROCEDURE — 99215 OFFICE O/P EST HI 40 MIN: CPT

## 2020-07-23 RX ORDER — LOSARTAN POTASSIUM 25 MG/1
25 TABLET, FILM COATED ORAL DAILY
Refills: 0 | Status: ACTIVE | COMMUNITY
Start: 2020-07-23

## 2020-07-23 NOTE — PHYSICAL EXAM
[General Appearance - Well Developed] : well developed [Normal Appearance] : normal appearance [Well Groomed] : well groomed [General Appearance - Well Nourished] : well nourished [No Deformities] : no deformities [General Appearance - In No Acute Distress] : no acute distress [Normal Conjunctiva] : the conjunctiva exhibited no abnormalities [Eyelids - No Xanthelasma] : the eyelids demonstrated no xanthelasmas [Normal Oral Mucosa] : normal oral mucosa [No Oral Cyanosis] : no oral cyanosis [Normal Jugular Venous A Waves Present] : normal jugular venous A waves present [No Oral Pallor] : no oral pallor [No Jugular Venous Cuenca A Waves] : no jugular venous cuenca A waves [Normal Jugular Venous V Waves Present] : normal jugular venous V waves present [Respiration, Rhythm And Depth] : normal respiratory rhythm and effort [Exaggerated Use Of Accessory Muscles For Inspiration] : no accessory muscle use [Auscultation Breath Sounds / Voice Sounds] : lungs were clear to auscultation bilaterally [Heart Sounds] : normal S1 and S2 [Heart Rate And Rhythm] : heart rate and rhythm were normal [Murmurs] : no murmurs present [Abdomen Soft] : soft [Abdomen Tenderness] : non-tender [Abdomen Mass (___ Cm)] : no abdominal mass palpated [Abnormal Walk] : normal gait [Gait - Sufficient For Exercise Testing] : the gait was sufficient for exercise testing [Nail Clubbing] : no clubbing of the fingernails [Cyanosis, Localized] : no localized cyanosis [Petechial Hemorrhages (___cm)] : no petechial hemorrhages [Skin Color & Pigmentation] : normal skin color and pigmentation [] : no rash [No Venous Stasis] : no venous stasis [Skin Lesions] : no skin lesions [No Skin Ulcers] : no skin ulcer [Oriented To Time, Place, And Person] : oriented to person, place, and time [No Xanthoma] : no  xanthoma was observed [Mood] : the mood was normal [Affect] : the affect was normal [No Anxiety] : not feeling anxious

## 2020-07-23 NOTE — HISTORY OF PRESENT ILLNESS
[FreeTextEntry1] : Floyd Mcleod is a 59 y/o man, marijuana user with Hx of Hashimoto's thyroiditis on levothyroxine, COPD, ASHLI, not on CPAP, GERD, CAD s/p PCI to mLAD (7/18/2018), of which all are stable and new onset persistent afib with RVR s/p ASHLIE/DCCV on 7/19/2018, repeat DCCV on 8/9/2018 with Amiodarone load, and now s/p PVI ablation on 8/24/2018, who presents today for routine f/u. Admits doing well  with no recurrence of afib from his knowledge. Patient states that he does not believe in medication. H self discontinued use of Eliquis and clopidogrel. Verbalizes understanding of thromboembolic risk without use of AC and importance of ASA/clopidogrel given CAD. Denies chest pain, palpitations, SOB, syncope or near syncope. Patient states that is does get dizziness while wearing his mask because of the "carbon dioxide". Patient also states that they is high level of carbon dioxide in the office and the examination room because of low ventilation which is making him slightly lightheaded.

## 2020-07-23 NOTE — DISCUSSION/SUMMARY
[FreeTextEntry1] : Floyd Mcleod is a 61 y/o man with Hx of Hashimoto's thyroiditis on levothyroxine, COPD, ASHLI, not on CPAP, GERD, CAD s/p PCI to mLAD (7/18/2018), of which all are stable and new onset persistent afib with RVR s/p ASHLIE/DCCV on 7/19/2018, repeat DCCV on 8/9/2018 with Amiodarone load, and now s/p PVI ablation on 8/24/2018, who presents today for routine f/u. \par \par Impression:\par 1. Persistent atrial fibrillation: s/p PVI ablation. EKG today NSR. Off beta blockers and Amiodarone at this time and self discontinued used of AC.Patient does not want a ILR placement for long term afib monitoring. Urged importance of monitoring given no use of AC and risk of thromboembolic event. Discussed at length and may consider in future. \par \par 2. HTN: Not currently on antihypertensives. Encouraged heart healthy diet, sodium restriction, and smoking cessation as well as weight loss. \par \par 3. CAD: self discontinued use of ASA and clopidogrel as prescribed. Encouraged compliance with medication given known CAD and PCI placement. Verbalizes understanding of risk without use of platelet inhibitors as prescribed.\par \par

## 2020-07-26 ENCOUNTER — NON-APPOINTMENT (OUTPATIENT)
Age: 60
End: 2020-07-26

## 2020-08-03 NOTE — DISCUSSION/SUMMARY
[FreeTextEntry1] : Floyd Mcleod is a 58 y/o man with Hx of Hashimoto's thyroiditis on levothyroxine, COPD, ASHLI, not on CPAP, GERD, CAD s/p PCI to mLAD (7/18/2018), of which all are stable and new onset persistent afib with RVR s/p ASHLIE/DCCV on 7/19/2018, repeat DCCV on 8/9/2018 with Amiodarone load, and now s/p PVI ablation on 8/24/2018, who presents today for routine f/u. \par \par Impression:\par \par 1. Persistent atrial fibrillation: s/p PVI ablation. EKG today NSR. Off beta blockers and Amiodarone at this time and self discontinued used of AC.  Recommend undergoing ILR placement for long term afib monitoring. Urged importance of monitoring given no use of AC and risk of thromboembolic event. Discussed at length and may consider in future. \par \par 2. HTN: BP elevated in office. Not currently on antihypertensives. Refuses medication at this time. Encouraged heart healthy diet, sodium restriction, and smoking cessation as well as weight loss. \par \par 3. CAD: self discontinued use of ASA and clopidogrel as prescribed. Encouraged compliance with medication given known CAD and PCI placement. Verbalizes understanding of risk without use of platelet inhibitors as prescribed.\par \par Will RTO for f/u in 6 months.  yes

## 2021-03-09 ENCOUNTER — TRANSCRIPTION ENCOUNTER (OUTPATIENT)
Age: 61
End: 2021-03-09

## 2021-08-06 NOTE — ED ADULT NURSE NOTE - OBJECTIVE STATEMENT
Chief Complaint   Patient presents with    Ear Pain    Cold     Cold symptoms for past week, fussy     There were no vitals taken for this visit. 1. Have you been to the ER, urgent care clinic since your last visit? Hospitalized since your last visit? No    2. Have you seen or consulted any other health care providers outside of the [address] [place] [address] since your last visit? Include any pap smears or colon screening.  No Pt A+OX3 c/o intermittent CP x1 week but tried to press through it.  Went to another hospital and left AMA last night.  Came here today because he thinks he has a blockage Pt A+OX3 c/o intermittent CP x1 week but tried to press through it.  Went to another hospital and left AMA last night.  Came here today because he thinks he has a blockage.  EKG done.  CM placed.  MD at bedside and cardizem given as ordered when pt developed CP and became diaphoretic.  Zoll placed.  EKG repeated.  IVF given as ordered.  Symptoms spontaneously subsided.  Labs obtained and sent as ordered.  #20g SL R AC placed

## 2021-09-09 ENCOUNTER — TRANSCRIPTION ENCOUNTER (OUTPATIENT)
Age: 61
End: 2021-09-09

## 2023-03-12 NOTE — ED PROVIDER NOTE - PSYCHIATRIC, MLM
English Alert and oriented to person, place, time/situation. normal mood and affect. no apparent risk to self or others.

## 2023-05-09 NOTE — DISCHARGE NOTE ADULT - ABILITY TO HEAR (WITH HEARING AID OR HEARING APPLIANCE IF NORMALLY USED):
Carpal Tunnel Release Postoperative Instructions  Demi Burroughs MD  Clinic phone number: [phone]    You may use the right hand for simple activities of daily living (writing, brushing teeth etc)  No strenuous activity or heavy lifting with right upper extremity   Start range of motion of fingers (open and close a fist) to prevent stiffness and help swelling resolve  Ice to the hand as needed -- 30 minutes on, 10 minutes off   Keep right upper extremity elevated above the heart as much as possible - this will help with swelling and keep pain under control  Keep dressing clean and dry and leave it on until you are seen in clinic.  If it gets wet please call clinic as soon as possible so the dressing can be changed.     You will be seen in clinic 2 weeks after your surgery.  The dressing will be removed and sutures will be taken out. After this visit you can advance the range of motion and use of your hand as tolerated with a 5 pound lifting restriction.   After 6 weeks you may return to full activity as tolerated     You may notice that the incision remains sensitive after it is healed.  This is normal and will improve over time.     Adequate: hears normal conversation without difficulty

## 2025-07-21 NOTE — ED ADULT TRIAGE NOTE - SPO2 (%)
Tylenol dose = 320 mg = 2 teaspoons (10 ml); children's ibuprofen (Motrin, Advil) dose = 200 mg = 2 teaspoons    Eye exam before school    Pediatric Ophthalmology docs:  MD Elva Mi/[place] [phone]  Balaji Gupta MD [place], [place] [phone]  [facility] - Dr Brittany Salguero MD, Maribell Jurado MD, Mitzy Hernandez, [phone]  [facility] - Dr Gaona, Dr Romero, Dr Gutierrez - [place] [phone]     98